# Patient Record
Sex: FEMALE | Race: WHITE | Employment: PART TIME | URBAN - METROPOLITAN AREA
[De-identification: names, ages, dates, MRNs, and addresses within clinical notes are randomized per-mention and may not be internally consistent; named-entity substitution may affect disease eponyms.]

---

## 2017-06-21 ENCOUNTER — OFFICE VISIT (OUTPATIENT)
Dept: FAMILY MEDICINE CLINIC | Age: 62
End: 2017-06-21

## 2017-06-21 VITALS
BODY MASS INDEX: 23.74 KG/M2 | HEIGHT: 62 IN | HEART RATE: 90 BPM | OXYGEN SATURATION: 98 % | TEMPERATURE: 98.5 F | DIASTOLIC BLOOD PRESSURE: 82 MMHG | WEIGHT: 129 LBS | SYSTOLIC BLOOD PRESSURE: 140 MMHG

## 2017-06-21 DIAGNOSIS — J30.2 SEASONAL ALLERGIC RHINITIS, UNSPECIFIED ALLERGIC RHINITIS TRIGGER: Primary | ICD-10-CM

## 2017-06-21 PROCEDURE — 99212 OFFICE O/P EST SF 10 MIN: CPT | Performed by: NURSE PRACTITIONER

## 2017-06-21 RX ORDER — DEXAMETHASONE 4 MG/1
4 TABLET ORAL 2 TIMES DAILY WITH MEALS
Qty: 10 TABLET | Refills: 0 | Status: SHIPPED | OUTPATIENT
Start: 2017-06-21 | End: 2017-06-26

## 2017-06-21 RX ORDER — FLUTICASONE PROPIONATE 50 MCG
1 SPRAY, SUSPENSION (ML) NASAL DAILY
Qty: 1 BOTTLE | Refills: 0 | Status: SHIPPED | OUTPATIENT
Start: 2017-06-21 | End: 2017-12-12 | Stop reason: ALTCHOICE

## 2017-06-21 ASSESSMENT — ENCOUNTER SYMPTOMS
HEMOPTYSIS: 0
SORE THROAT: 0
WHEEZING: 0
FACIAL SWELLING: 0
RHINORRHEA: 1
VOICE CHANGE: 0
COUGH: 1
HEARTBURN: 0
SHORTNESS OF BREATH: 0
STRIDOR: 0
TROUBLE SWALLOWING: 0
SINUS PRESSURE: 0

## 2017-07-10 ENCOUNTER — OFFICE VISIT (OUTPATIENT)
Dept: FAMILY MEDICINE CLINIC | Age: 62
End: 2017-07-10

## 2017-07-10 VITALS
SYSTOLIC BLOOD PRESSURE: 124 MMHG | WEIGHT: 127 LBS | BODY MASS INDEX: 23.37 KG/M2 | HEART RATE: 95 BPM | DIASTOLIC BLOOD PRESSURE: 86 MMHG | OXYGEN SATURATION: 99 % | HEIGHT: 62 IN

## 2017-07-10 DIAGNOSIS — M25.551 HIP PAIN, ACUTE, RIGHT: Primary | ICD-10-CM

## 2017-07-10 DIAGNOSIS — R05.9 COUGH: ICD-10-CM

## 2017-07-10 DIAGNOSIS — J30.9 ALLERGIC RHINITIS, UNSPECIFIED ALLERGIC RHINITIS TRIGGER, UNSPECIFIED RHINITIS SEASONALITY: ICD-10-CM

## 2017-07-10 PROCEDURE — 99212 OFFICE O/P EST SF 10 MIN: CPT | Performed by: NURSE PRACTITIONER

## 2017-07-10 RX ORDER — BENZONATATE 100 MG/1
100 CAPSULE ORAL 3 TIMES DAILY PRN
Qty: 30 CAPSULE | Refills: 0 | Status: SHIPPED | OUTPATIENT
Start: 2017-07-10 | End: 2017-07-20

## 2017-07-10 RX ORDER — METHYLPREDNISOLONE 4 MG/1
TABLET ORAL
Qty: 1 KIT | Refills: 0 | Status: SHIPPED | OUTPATIENT
Start: 2017-07-10 | End: 2017-08-02 | Stop reason: ALTCHOICE

## 2017-07-10 ASSESSMENT — ENCOUNTER SYMPTOMS
COUGH: 1
RHINORRHEA: 1
SHORTNESS OF BREATH: 0
WHEEZING: 0

## 2017-07-10 ASSESSMENT — PATIENT HEALTH QUESTIONNAIRE - PHQ9
SUM OF ALL RESPONSES TO PHQ9 QUESTIONS 1 & 2: 0
2. FEELING DOWN, DEPRESSED OR HOPELESS: 0
1. LITTLE INTEREST OR PLEASURE IN DOING THINGS: 0
SUM OF ALL RESPONSES TO PHQ QUESTIONS 1-9: 0

## 2017-08-02 ENCOUNTER — OFFICE VISIT (OUTPATIENT)
Dept: FAMILY MEDICINE CLINIC | Age: 62
End: 2017-08-02

## 2017-08-02 VITALS
BODY MASS INDEX: 22.82 KG/M2 | WEIGHT: 124 LBS | HEIGHT: 62 IN | DIASTOLIC BLOOD PRESSURE: 90 MMHG | SYSTOLIC BLOOD PRESSURE: 146 MMHG | OXYGEN SATURATION: 97 % | HEART RATE: 133 BPM

## 2017-08-02 DIAGNOSIS — R26.2 DIFFICULTY WALKING: ICD-10-CM

## 2017-08-02 DIAGNOSIS — S70.02XS CONTUSION OF LEFT HIP, SEQUELA: Primary | ICD-10-CM

## 2017-08-02 DIAGNOSIS — F41.1 GENERALIZED ANXIETY DISORDER: ICD-10-CM

## 2017-08-02 DIAGNOSIS — M25.551 HIP PAIN, ACUTE, RIGHT: ICD-10-CM
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
## 2017-08-02 PROCEDURE — 99214 OFFICE O/P EST MOD 30 MIN: CPT | Performed by: FAMILY MEDICINE

## 2017-08-02 ASSESSMENT — ENCOUNTER SYMPTOMS
ABDOMINAL PAIN: 0
BACK PAIN: 1
EYE DISCHARGE: 0
EYE REDNESS: 0
NAUSEA: 0
COLOR CHANGE: 0
SHORTNESS OF BREATH: 0

## 2017-08-29 ENCOUNTER — OFFICE VISIT (OUTPATIENT)
Dept: FAMILY MEDICINE CLINIC | Age: 62
End: 2017-08-29

## 2017-08-29 VITALS
HEIGHT: 62 IN | BODY MASS INDEX: 23.92 KG/M2 | WEIGHT: 130 LBS | HEART RATE: 106 BPM | SYSTOLIC BLOOD PRESSURE: 132 MMHG | OXYGEN SATURATION: 98 % | DIASTOLIC BLOOD PRESSURE: 80 MMHG

## 2017-08-29 DIAGNOSIS — H11.31 SUBCONJUNCTIVAL HEMATOMA, RIGHT: ICD-10-CM

## 2017-08-29 DIAGNOSIS — B30.9 ACUTE VIRAL CONJUNCTIVITIS OF BOTH EYES: Primary | ICD-10-CM

## 2017-08-29 PROCEDURE — 99214 OFFICE O/P EST MOD 30 MIN: CPT | Performed by: FAMILY MEDICINE

## 2017-08-29 RX ORDER — TOBRAMYCIN AND DEXAMETHASONE 3; 1 MG/ML; MG/ML
1 SUSPENSION/ DROPS OPHTHALMIC 3 TIMES DAILY PRN
Qty: 1 BOTTLE | Refills: 0 | Status: SHIPPED | OUTPATIENT
Start: 2017-08-29 | End: 2017-09-05

## 2017-08-29 RX ORDER — AZITHROMYCIN 250 MG/1
250 TABLET, FILM COATED ORAL DAILY
Qty: 6 TABLET | Refills: 0 | Status: SHIPPED | OUTPATIENT
Start: 2017-08-29 | End: 2017-09-03

## 2017-08-29 ASSESSMENT — ENCOUNTER SYMPTOMS
ABDOMINAL PAIN: 0
EYE ITCHING: 1
EYE PAIN: 0
EYE REDNESS: 1
NAUSEA: 0
EYE DISCHARGE: 1
COLOR CHANGE: 0
SHORTNESS OF BREATH: 0
PHOTOPHOBIA: 0

## 2017-11-01 ENCOUNTER — TELEPHONE (OUTPATIENT)
Dept: ORTHOPEDIC SURGERY | Age: 62
End: 2017-11-01

## 2017-11-01 ENCOUNTER — OFFICE VISIT (OUTPATIENT)
Dept: ORTHOPEDIC SURGERY | Age: 62
End: 2017-11-01

## 2017-11-01 VITALS — WEIGHT: 130.07 LBS | BODY MASS INDEX: 26.22 KG/M2 | HEIGHT: 59 IN

## 2017-11-01 DIAGNOSIS — S63.91XA HAND SPRAIN, RIGHT, INITIAL ENCOUNTER: Primary | ICD-10-CM

## 2017-11-01 DIAGNOSIS — M25.641 FINGER STIFFNESS, RIGHT: ICD-10-CM

## 2017-11-01 PROCEDURE — L3918 METACARP FX ORTHOSIS PRE OTS: HCPCS | Performed by: ORTHOPAEDIC SURGERY

## 2017-11-01 PROCEDURE — 99213 OFFICE O/P EST LOW 20 MIN: CPT | Performed by: ORTHOPAEDIC SURGERY

## 2017-11-01 NOTE — PROGRESS NOTES
Chief Complaint  Wrist Pain (Right wrist pain, fell 10/27/2017)      History of Present Illness:  Nemo Lake is a 60 y.o. female right-hand-dominant who presents today for right wrist pain and right thumb pain which began 5 days ago when she fell landing awkwardly on her right hand and wrist.  She had immediate pain followed by swelling and stiffness. She was seen in the emergency room, x-rays were inconclusive for fracture but she was immobilized. Her pain at this point is at the base of the thumb. This pain is improved with elevation and rest.  Pain is worsened with attempted gripping or lifting. She denies numbness but feels quite a bit of weakness. Medical History  Past Medical History:   Diagnosis Date    Anxiety     Hyperlipidemia     Hypertension      Past Surgical History:   Procedure Laterality Date    BUNIONECTOMY       Family History   Problem Relation Age of Onset    High Blood Pressure Mother     Diabetes Mother      Social History     Social History    Marital status:      Spouse name: N/A    Number of children: N/A    Years of education: N/A     Social History Main Topics    Smoking status: Former Smoker    Smokeless tobacco: None    Alcohol use 3.6 oz/week     6 Cans of beer per week      Comment: 6/day    Drug use: No    Sexual activity: Not Asked     Other Topics Concern    None     Social History Narrative    None     Current Outpatient Prescriptions   Medication Sig Dispense Refill    ibuprofen (ADVIL;MOTRIN) 600 MG tablet Take 1 tablet by mouth every 8 hours as needed for Pain 20 tablet 0    sertraline (ZOLOFT) 50 MG tablet Take 1 tablet by mouth daily 30 tablet 11    fluticasone (FLONASE) 50 MCG/ACT nasal spray 1 spray by Nasal route daily 1 Bottle 0     No current facility-administered medications for this visit.       No Known Allergies    Review of Systems  Relevant review of systems reviewed and available in the patient's chart    Vital Signs  There were Health

## 2017-11-22 ENCOUNTER — OFFICE VISIT (OUTPATIENT)
Dept: ORTHOPEDIC SURGERY | Age: 62
End: 2017-11-22

## 2017-11-22 VITALS
WEIGHT: 130.07 LBS | BODY MASS INDEX: 26.22 KG/M2 | HEIGHT: 59 IN | HEART RATE: 88 BPM | DIASTOLIC BLOOD PRESSURE: 85 MMHG | SYSTOLIC BLOOD PRESSURE: 137 MMHG

## 2017-11-22 DIAGNOSIS — S66.811A EXTENSOR TENDON RUPTURE OF HAND, RIGHT, INITIAL ENCOUNTER: ICD-10-CM

## 2017-11-22 DIAGNOSIS — M25.531 WRIST PAIN, RIGHT: Primary | ICD-10-CM

## 2017-11-22 DIAGNOSIS — S63.91XA HAND SPRAIN, RIGHT, INITIAL ENCOUNTER: ICD-10-CM

## 2017-11-22 PROCEDURE — 99213 OFFICE O/P EST LOW 20 MIN: CPT | Performed by: ORTHOPAEDIC SURGERY

## 2017-11-22 NOTE — LETTER
[place] [id number] [address]  Phone: [phone]  Fax: [phone]    Ashutosh Donahue MD        November 22, 2017     Patient: Sherolyn Sandifer   YOB: 1955   Date of Visit: 11/22/2017       To Whom it May Concern:    Li Rico was seen in my clinic on 11/22/2017. She may return to work on 11/27/17. she was under our care from 10/27/17 and unable to work secondary to her injury. .    If you have any questions or concerns, please don't hesitate to call.     Sincerely,       MD Ashutosh Anne MD

## 2017-11-22 NOTE — PROGRESS NOTES
Chief Complaint   Patient presents with    Follow-up     Right hand sprain DOI 10/27/2017       HISTORY OF PRESENT ILLNESS:  Fernando Swartz is a 60 y.o.  patient here for repeat evaluation after sustaining a Right hand and wrist sprain one month ago, treated nonoperatively. Overall her symptoms are much improved. She has mild achiness at the base of the thumb. She denies pain at the wrist.  She denies new trauma. However she states over the past 1 or 2 weeks she has been unable to extend her right thumb. She denies numbness    ROS:  ROS neg     Past medical history is reviewed again today, no changes to report    PHYSICAL EXAMINATION:  Patient is alert and pleasant, in no acute distress. The affected extremity is examined today. Right hand and wrist reveal good alignment clinically except for flex deformity thumb IP joint. She does not have active IP joint extension, passively correctable. Consistent with EPL rupture. Minimal discomfort over the dorsal radial hand. There is tenderness to palpation volarly and dorsally at the [facility] joint of the thumb with a positive grind test.  Negative Finkelstein test.  No pain with palpation at the distal radius or distal ulna. There is slight ecchymosis dorsal radial hand. No ecchymosis volarly today. Compartments soft throughout. Full active flexion and extension of the other digits without clicking or snapping.  strength weakness is present. No numbness of the fingers. X-rays:Patient refused any x-rays today secondary to financial constraints    X-rays from the emergency room are reviewed again today:  3 views of the right wrist, impression:     Impression   1. Negative for fracture. 2. Chronic erosive changes changes of the proximal medial carpus with   associated chondrocalcinosis.  Gout is a diagnostic consideration. On closer look today, there may have been a dorsal distal radius fracture noted on the lateral with buckling.   No displacement of

## 2017-12-12 PROBLEM — E83.42 HYPOMAGNESEMIA: Status: ACTIVE | Noted: 2017-12-12

## 2017-12-12 PROBLEM — F10.939 ALCOHOL WITHDRAWAL (HCC): Status: ACTIVE | Noted: 2017-12-12

## 2017-12-12 PROBLEM — A41.9 SEPSIS (HCC): Status: ACTIVE | Noted: 2017-12-12

## 2017-12-12 PROBLEM — E87.1 HYPONATREMIA: Status: ACTIVE | Noted: 2017-12-12

## 2017-12-12 PROBLEM — E87.6 HYPOKALEMIA: Status: ACTIVE | Noted: 2017-12-12

## 2017-12-12 PROBLEM — J18.9 PNA (PNEUMONIA): Status: ACTIVE | Noted: 2017-12-12

## 2017-12-13 PROBLEM — F10.931 ALCOHOL WITHDRAWAL SYNDROME, WITH DELIRIUM (HCC): Status: ACTIVE | Noted: 2017-12-12

## 2017-12-17 PROBLEM — F10.931 ALCOHOL WITHDRAWAL SYNDROME, WITH DELIRIUM (HCC): Status: RESOLVED | Noted: 2017-12-12 | Resolved: 2017-12-17

## 2017-12-17 PROBLEM — A41.9 SEPSIS (HCC): Status: RESOLVED | Noted: 2017-12-12 | Resolved: 2017-12-17

## 2017-12-27 ENCOUNTER — TELEPHONE (OUTPATIENT)
Dept: FAMILY MEDICINE CLINIC | Age: 62
End: 2017-12-27

## 2017-12-27 NOTE — TELEPHONE ENCOUNTER
Was hospitalized for pneumonia, put on 6 different rx's and could only afford 5 of them, inhaler (Spriva handihaler) she could not afford and was $400 and wants to know if there is a cheaper option. Having side effects with the new meds she was put on: Swelling of feet, itchy all over, extreme diarrhea. She's asking for you to call her regarding this before her appt. Future Appointments  Date Time Provider Travon Alanis   1/2/2018 8:00 AM DO TRETN Lomeli       Please advise.

## 2017-12-27 NOTE — TELEPHONE ENCOUNTER
Patient does not have insurance. The cheapest she had for the Spiriva was at [address] [place] [address] for $388 and would like something different.  Patient does not feel it is necessary to go back to ER at this time but will go if the swelling and itching get worse

## 2017-12-28 ENCOUNTER — OFFICE VISIT (OUTPATIENT)
Dept: OTHER | Age: 62
End: 2017-12-28

## 2017-12-28 ENCOUNTER — HOSPITAL ENCOUNTER (OUTPATIENT)
Dept: OTHER | Age: 62
Discharge: HOME | End: 2017-12-28
Attending: INTERNAL MEDICINE | Admitting: INTERNAL MEDICINE

## 2017-12-28 ENCOUNTER — HOSPITAL ENCOUNTER (OUTPATIENT)
Dept: GENERAL RADIOLOGY | Age: 62
Discharge: HOME | End: 2017-12-28
Attending: INTERNAL MEDICINE | Admitting: INTERNAL MEDICINE

## 2017-12-28 VITALS — HEART RATE: 80 BPM | TEMPERATURE: 98.3 F | SYSTOLIC BLOOD PRESSURE: 152 MMHG | DIASTOLIC BLOOD PRESSURE: 94 MMHG

## 2017-12-28 DIAGNOSIS — I34.0 SEVERE MITRAL REGURGITATION: ICD-10-CM

## 2017-12-28 DIAGNOSIS — E87.1 HYPONATREMIA: Primary | ICD-10-CM

## 2017-12-28 DIAGNOSIS — I10 ESSENTIAL HYPERTENSION: ICD-10-CM

## 2017-12-28 DIAGNOSIS — J18.9 PNEUMONIA OF RIGHT MIDDLE LOBE DUE TO INFECTIOUS ORGANISM: ICD-10-CM

## 2017-12-28 LAB
ANION GAP SERPL CALCULATED.3IONS-SCNC: 15 MMOL/L (ref 3–16)
BUN BLDV-MCNC: 6 MG/DL (ref 7–20)
CALCIUM SERPL-MCNC: 9.7 MG/DL (ref 8.3–10.6)
CHLORIDE BLD-SCNC: 100 MMOL/L (ref 99–110)
CO2: 23 MMOL/L (ref 21–32)
CREAT SERPL-MCNC: <0.5 MG/DL (ref 0.6–1.2)
GFR AFRICAN AMERICAN: >60
GFR NON-AFRICAN AMERICAN: >60
GLUCOSE BLD-MCNC: 100 MG/DL (ref 70–99)
POTASSIUM SERPL-SCNC: 4.6 MMOL/L (ref 3.5–5.1)
SODIUM BLD-SCNC: 138 MMOL/L (ref 136–145)

## 2017-12-28 PROCEDURE — 99999 PR OFFICE/OUTPT VISIT,PROCEDURE ONLY: CPT | Performed by: INTERNAL MEDICINE

## 2017-12-28 RX ORDER — LISINOPRIL 20 MG/1
20 TABLET ORAL DAILY
Qty: 30 TABLET | Refills: 5 | Status: ON HOLD | OUTPATIENT
Start: 2017-12-28 | End: 2018-02-09 | Stop reason: HOSPADM

## 2017-12-28 NOTE — PROGRESS NOTES
SUBJECTIVE:   New patient to [facility], she was hospitalized 12/12 - 12/17 with alcohol withdrawal requiring ICU detox via [facility], sepsis due to Pneumonia of right lung, and severe mitral regurgitation with flail posterior leaflet mitral valve with subsequent pulmonary hypertension. She is unable to afford Spiriva but did to fill five other prescriptions. She has been taking lactulose twice per day with subsequent diarrhea. She has not had a drink of alcohol since discharge. She has not smoked in 40 years. She is not volume overloaded from the mitral regurgitation.      She is seen in [facility] due to her loss of insurance. MEDICATIONS:  lisinopril (PRINIVIL;ZESTRIL) 10 MG tablet Take 1 tablet by mouth daily   metoprolol succinate (TOPROL XL) 50 MG extended release tablet Take 1 tablet by mouth daily   lactulose (CHRONULAC) 10 GM/15ML solution Take 15 mLs by mouth 2 times daily   potassium chloride (KLOR-CON M) 10 MEQ extended release tablet Take 1 tablet by mouth 2 times daily  (discontinue)   tiotropium (SPIRIVA HANDIHALER) 18 MCG inhalation capsule Inhale 1 capsule into the lungs daily (did not fill, discontinue)       LABS: 12/16/2017  WBC 5.1   HGB 11.7 (L)   PLT 79 (L)      (L)   K 3.4 (L)   CL 96 (L)   CO2 23   BUN 7   CREATININE <0.5 (L)   GLUCOSE 88     LABS: 12-28-17  Sodium 138    Potassium 4.6    Chloride 100    CO2 23    Anion Gap 15    Glucose 100     BUN 6    CREATININE <0.5        PFT: pending next visit    OBJECTIVE:    VITALS:  /94   Pulse 80   Temp 98.3 °F  O2sat 97% on room air  HEENT:  Oropharynx clear, no lymphadenopathy,   LUNGS:  Clear and without wheezes, reasonable air movement  HEART:  Regular rhythm, harsh IV/VI systolic murmur left sternal border  ABD:  Benign, active bowel sounds  EXT:  No edema, pulses palpable  NEURO:  No focal neurologic deficits noted. No asterixis. ASSESSMENT / PLAN:   1.  Alcohol induced cirrhosis with evidence of hepatic encephalopathy:  Continue off any alcohol at all. Continue taking lactulose 15 mls twice per day. This is intended to cause some diarrhea. 2. Hyponatremia (low sodium) in the hospital:  This has resolved by lab check today. Continue off alcohol. 3. Thrombocytopenia (low platelets) due to cirrhosis. No need to recheck today but will check in 6-8 weeks. 4. Emphysema (COPD):  No need for an inhaler at this time. We will check spirometry (pulmonary function test) next visit. 5. Blood pressure:  Elevated today at 152/94, goal less than 140/90. Continue taking metoprolol succinate (Toprol XL) 50 mg once per day. Continue taking Lisinopril but increase from 10 mg to 20 mg daily. Take two of the 10 mg tablets until you run out then fill a new prescription. 6. Mitral regurgitation with \"flail\" posterior leaflet mitral valve: We will assure a follow up with cardiothoracic surgery. 7. Pneumonia: We will recheck a chest x-ray to confirm resolution in six weeks. 8. Hypokalemia:  Potassium has normalized. She can now STOP taking potassium, especially on the ACE inhibitor.        Follow-up in two weeks, confirm follow up with cardiothoracic surgery

## 2017-12-28 NOTE — PATIENT INSTRUCTIONS
1. Alcohol induced cirrhosis with evidence of hepatic encephalopathy:  Continue off any alcohol at all. Continue taking lactulose 15 mls twice per day. This is intended to cause some diarrhea. 2. Hyponatremia (low sodium) in the hospital:  We will recheck a metabolic panel at the lab. 3. Thrombocytopenia (low platelets) due to cirrhosis. No need to recheck today but will check in 6-8 weeks. 4. Emphysema (COPD):  No need for an inhaler at this time. We will check spirometry (pulmonary function test) next visit. 5. Blood pressure:  Elevated today at 152/94, goal less than 140/90. Continue taking metoprolol succinate (Toprol XL) 50 mg once per day. Continue taking Lisinopril but increase from 10 mg to 20 mg daily. Take two of the 10 mg tablets until you run out then fill a new prescription. 6. Mitral regurgitation with \"flail\" posterior leaflet mitral valve: We will assure a follow up with cardiothoracic surgery. 7. Pneumonia: We will recheck a chest x-ray to confirm resolution in six weeks.      Follow-up in two weeks

## 2018-01-03 ENCOUNTER — OFFICE VISIT (OUTPATIENT)
Dept: CARDIOTHORACIC SURGERY | Age: 63
End: 2018-01-03

## 2018-01-03 VITALS
DIASTOLIC BLOOD PRESSURE: 110 MMHG | WEIGHT: 130 LBS | OXYGEN SATURATION: 94 % | HEART RATE: 108 BPM | HEIGHT: 62 IN | TEMPERATURE: 97.3 F | BODY MASS INDEX: 23.92 KG/M2 | SYSTOLIC BLOOD PRESSURE: 188 MMHG

## 2018-01-03 DIAGNOSIS — I34.0 SEVERE MITRAL REGURGITATION: Primary | ICD-10-CM

## 2018-01-03 DIAGNOSIS — I10 ESSENTIAL HYPERTENSION: ICD-10-CM

## 2018-01-03 PROCEDURE — 99202 OFFICE O/P NEW SF 15 MIN: CPT | Performed by: THORACIC SURGERY (CARDIOTHORACIC VASCULAR SURGERY)

## 2018-01-03 RX ORDER — POTASSIUM CHLORIDE 750 MG/1
10 TABLET, EXTENDED RELEASE ORAL 2 TIMES DAILY
Status: ON HOLD | COMMUNITY
End: 2018-02-05 | Stop reason: CLARIF

## 2018-01-03 NOTE — Clinical Note
Thank you for your referral patient will need coronary catheterizations ultrasound liver and CT chest patient's could qualify for minimum access mitral valve if coronary is normal

## 2018-01-03 NOTE — PROGRESS NOTES
Review of Systems:  Constitutional:  + fatigue, + difficulty sleeping. No night sweats, headaches, weight loss. Eyes:  + glasses. No glaucoma, cataracts. ENMT:  No nosebleeds, deviated septum. Has not followed with dentist in a while. Cardiac:  + occasional heart palpiations after exertion. No chest pain. No arrhythmias, previous MI. Vascular:  No claudication, varicosities. GI:  No PUD, heartburn. :  No kidney stones, frequent UTIs. Musculoskeletal:  + arthritis. No gout. Respiratory:  + occasional SOB. No emphysema, asthma. Integumentary:  No dermatitis, itching, rash. Neurological: + possible hx of TIA 2-3 years ago. No stroke, seizures. Psychiatric: + anxiety. No depression. Endocrine: No diabetes, thyroid issues. Hematologic:  No bleeding, easy bruising. Immunologic:  No known cancer, steroid therapies.
since 12/12/17.  Drug use: No    Sexual activity: Not on file     Other Topics Concern    Not on file     Social History Narrative    No narrative on file       Family History:  Heart Disease:   Stroke:   Cancer:   Diabetes:   Hypertension:   Aneurysm/PVD:         Problem Relation Age of Onset    High Blood Pressure Mother     Diabetes Mother        Review of Systems:  Constitutional:  No night sweats, headaches, weight loss. Eyes:  No glaucoma, cataracts. ENMT:  No nosebleeds, deviated septum. Cardiac:  No arrhythmias, previous MI. Vascular:  No claudication, varicosities. GI:  No PUD, heartburn. :  No kidney stones, frequent UTIs  Musculoskeletal:  No arthritis, gout. Respiratory:  No SOB, emphysema, asthma. Integumentary:  No dermatitis, itching, rash. Neurological:  No stroke, TIAs, seizures. Psychiatric:  No depression, anxiety. Endocrine: No diabetes, thyroid issues. Hematologic:  No bleeding, easy bruising. Immunologic:  No known cancer, steroid therapies. Physical Examination:    BP (!) 188/110 (Site: Left Arm, Position: Sitting, Cuff Size: Medium Adult)   Pulse 108   Temp 97.3 °F (36.3 °C) (Oral)   Ht 5' 2\" (1.575 m)   Wt 130 lb (59 kg)   SpO2 94%   BMI 23.78 kg/m²      BP RUE:  BP LUE:   Admission Weight: 130 lb (59 kg)   Hand dominance:    General appearance: NAD, well nourished  Eyes: anicteric, PERRLA  ENMT: no scars or lesions, no nasal deformity, normal dentition, no cyanosis of oral mucosa  Neck: no masses, no thyroid enlargement, no JVD. Respiratory: effort is unlabored, symmetric, no crackles, wheezes or rubs. No palpable/percussable abnormalities. Cardiovascular: regular, no murmur. PMI normal, no thrill. No carotid bruits. No edema or varicosities. Abdominal aorta cannot be appreciated given body habitus. GI: abdomen soft, nondistended, no organomegaly. No masses.   Lymphatic: no cervical/supraclavicular adenopathy  Musculoskeletal: strength and tone normal.

## 2018-01-04 ENCOUNTER — TELEPHONE (OUTPATIENT)
Dept: CARDIOTHORACIC SURGERY | Age: 63
End: 2018-01-04

## 2018-01-04 ENCOUNTER — TELEPHONE (OUTPATIENT)
Dept: CARDIOLOGY CLINIC | Age: 63
End: 2018-01-04

## 2018-01-05 NOTE — TELEPHONE ENCOUNTER
Patient informed to take her morning medications as prescribed with sips of water. She also received her email from [address] [facility].

## 2018-01-10 ENCOUNTER — HOSPITAL ENCOUNTER (OUTPATIENT)
Dept: VASCULAR LAB | Age: 63
Discharge: HOME | End: 2018-01-10
Attending: THORACIC SURGERY (CARDIOTHORACIC VASCULAR SURGERY) | Admitting: THORACIC SURGERY (CARDIOTHORACIC VASCULAR SURGERY)

## 2018-01-10 VITALS — OXYGEN SATURATION: 96 %

## 2018-01-10 DIAGNOSIS — I34.0 MITRAL VALVE INSUFFICIENCY, UNSPECIFIED ETIOLOGY: ICD-10-CM

## 2018-01-10 DIAGNOSIS — G45.9 TRANSIENT CEREBRAL ISCHEMIC ATTACK: ICD-10-CM

## 2018-01-10 RX ORDER — ALBUTEROL SULFATE 90 UG/1
4 AEROSOL, METERED RESPIRATORY (INHALATION) ONCE
Status: COMPLETED | OUTPATIENT
Start: 2018-01-10 | End: 2018-01-10

## 2018-01-10 RX ADMIN — ALBUTEROL SULFATE 4 PUFF: 90 AEROSOL, METERED RESPIRATORY (INHALATION) at 10:10

## 2018-01-17 ENCOUNTER — OFFICE VISIT (OUTPATIENT)
Dept: CARDIOTHORACIC SURGERY | Age: 63
End: 2018-01-17

## 2018-01-17 VITALS
HEART RATE: 113 BPM | DIASTOLIC BLOOD PRESSURE: 118 MMHG | SYSTOLIC BLOOD PRESSURE: 170 MMHG | HEIGHT: 62 IN | TEMPERATURE: 97.6 F | WEIGHT: 133.5 LBS | OXYGEN SATURATION: 97 % | BODY MASS INDEX: 24.56 KG/M2

## 2018-01-17 DIAGNOSIS — E83.42 HYPOMAGNESEMIA: ICD-10-CM

## 2018-01-17 DIAGNOSIS — I34.0 SEVERE MITRAL REGURGITATION: Primary | ICD-10-CM

## 2018-01-17 DIAGNOSIS — I10 ESSENTIAL HYPERTENSION: ICD-10-CM

## 2018-01-17 PROCEDURE — 99213 OFFICE O/P EST LOW 20 MIN: CPT | Performed by: THORACIC SURGERY (CARDIOTHORACIC VASCULAR SURGERY)

## 2018-01-18 ENCOUNTER — TELEPHONE (OUTPATIENT)
Dept: CARDIOTHORACIC SURGERY | Age: 63
End: 2018-01-18

## 2018-01-18 NOTE — TELEPHONE ENCOUNTER
Spoke with patient regarding schedule of surgery on 2/5/2018 @ 7:00 am with arrival time of 5:30 am @ [facility] with Valorie Hicks to include: minimal access mitral valve repair, possible tissue valve replacement. Patient completed most of her pre-op labs but needs urinalysis and updated chest x-ray. I explained that a nurse might be calling you to complete prior to surgery or they may elect to complete upon arrival for surgery. Patient knows not to eat or drink after midnight the day of surgery and to call me with any questions or concerns.

## 2018-01-24 ENCOUNTER — HOSPITAL ENCOUNTER (OUTPATIENT)
Dept: OTHER | Age: 63
Discharge: HOME | End: 2018-01-24
Attending: INTERNAL MEDICINE | Admitting: INTERNAL MEDICINE

## 2018-01-24 ENCOUNTER — OFFICE VISIT (OUTPATIENT)
Dept: OTHER | Age: 63
End: 2018-01-24

## 2018-01-24 VITALS
DIASTOLIC BLOOD PRESSURE: 100 MMHG | OXYGEN SATURATION: 98 % | BODY MASS INDEX: 23.74 KG/M2 | SYSTOLIC BLOOD PRESSURE: 180 MMHG | WEIGHT: 129 LBS | HEIGHT: 62 IN | HEART RATE: 104 BPM

## 2018-01-24 DIAGNOSIS — K70.30 ALCOHOLIC CIRRHOSIS OF LIVER WITHOUT ASCITES (HCC): Primary | ICD-10-CM

## 2018-01-24 DIAGNOSIS — I34.0 SEVERE MITRAL REGURGITATION: ICD-10-CM

## 2018-01-24 DIAGNOSIS — J43.9 PULMONARY EMPHYSEMA, UNSPECIFIED EMPHYSEMA TYPE (HCC): ICD-10-CM

## 2018-01-24 PROCEDURE — 99999 PR OFFICE/OUTPT VISIT,PROCEDURE ONLY: CPT | Performed by: INTERNAL MEDICINE

## 2018-01-24 RX ORDER — HYDROCHLOROTHIAZIDE 25 MG/1
25 TABLET ORAL DAILY
Qty: 30 TABLET | Refills: 3 | Status: ON HOLD | OUTPATIENT
Start: 2018-01-24 | End: 2018-02-09 | Stop reason: HOSPADM

## 2018-01-24 NOTE — PROGRESS NOTES
SUBJECTIVE:  One month follow up from 12/28/17  She will have mitral valve repair at [facility] on February 5. Pre-op testing including spirometry and carotid doppler have been completed. She stopped lactulose on the recommendation of Dr. Lauren Zamora. Elevated blood pressure at home with average 150/90. Dyspnea on exertion about the same. She is unable to walk up a flight of stairs without stopping. Continued fatigue and dyspnea. Using a cane for stability in the her right (right hand dominant) mostly for \"security\". Minimal lower extremity edema mostly when upright for a while. History of present illness:  Hospitalized 12/12 - 12/17 with alcohol withdrawal requiring ICU detox via [address] [place] [address] Ave, sepsis due to Pneumonia of right lung, and severe mitral regurgitation with flail posterior leaflet mitral valve with subsequent pulmonary hypertension. She is unable to afford Spiriva but did to fill five other prescriptions. She has been taking lactulose twice per day with subsequent diarrhea. She has not had a drink of alcohol since discharge. She has not smoked in 40 years. She is not volume overloaded from the mitral regurgitation.   She is seen in [facility] due to her loss of insurance. MEDICATIONS:  potassium chloride (KLOR-CON M) 20 MEQ extended release tablet Taking one tablet daily    lisinopril (PRINIVIL;ZESTRIL) 20 MG tablet Take 1 tablet by mouth daily   metoprolol succinate (TOPROL XL) 50 MG extended release tablet Take 1 tablet by mouth daily       LABS:  01/12/2018  WBC 5.9   HGB 12.5           K 3.9   CL 98 (L)   CO2 22   BUN 7   CREATININE 0.6   GLUCOSE 108 (H)     Cholesterol, Total 202    Triglycerides 104    HDL 67    LDL Calculated 114      INR (12/16/17) 1.19    Spirometry (1/11/18) FVC 1.84 liters, 65% predicted, FEV1 1.35 liters, 59% predicted, with FEV1/FVC ratio of 70%. There was no response to bronchodilator.   Lung volume showed borderline criteria for air

## 2018-02-05 PROBLEM — Z98.890 S/P MVR (MITRAL VALVE REPAIR): Status: ACTIVE | Noted: 2018-02-05

## 2018-02-12 ENCOUNTER — TELEPHONE (OUTPATIENT)
Dept: CARDIOTHORACIC SURGERY | Age: 63
End: 2018-02-12

## 2018-02-12 NOTE — TELEPHONE ENCOUNTER
Bandar Lacey with [facility] called with update on patient. She went out to see her today. Patients BP sitting was 160/80 and then dropped to 110/70 while standing. + for orthostatic hypotension. Patient had some very mild dizziness but no other symptoms. She was discharged on a lot of new medications. Also has some minimal trace edema in ankles/feet bilaterally. She also reports some serosanguinous drainage out of CT site. Denies purulence or odor. No redness around site. Advised to keep clean with soap and water and keep covered if needed. We will add her on to our schedule for this week to discuss blood pressure issues and check her incisions. She is agreeable to this. No further needs at this time.

## 2018-02-14 ENCOUNTER — OFFICE VISIT (OUTPATIENT)
Dept: CARDIOTHORACIC SURGERY | Age: 63
End: 2018-02-14

## 2018-02-14 VITALS
DIASTOLIC BLOOD PRESSURE: 100 MMHG | TEMPERATURE: 97.9 F | HEIGHT: 62 IN | WEIGHT: 123.6 LBS | BODY MASS INDEX: 22.74 KG/M2 | HEART RATE: 89 BPM | OXYGEN SATURATION: 96 % | SYSTOLIC BLOOD PRESSURE: 172 MMHG

## 2018-02-14 DIAGNOSIS — Z98.890 S/P MVR (MITRAL VALVE REPAIR): Primary | ICD-10-CM

## 2018-02-14 DIAGNOSIS — E87.1 HYPONATREMIA WITH EXTRACELLULAR FLUID DEPLETION: ICD-10-CM

## 2018-02-14 DIAGNOSIS — I10 HYPERTENSION, UNSPECIFIED TYPE: Primary | ICD-10-CM

## 2018-02-14 DIAGNOSIS — I34.0 SEVERE MITRAL REGURGITATION: ICD-10-CM

## 2018-02-14 PROCEDURE — 99024 POSTOP FOLLOW-UP VISIT: CPT | Performed by: THORACIC SURGERY (CARDIOTHORACIC VASCULAR SURGERY)

## 2018-02-14 RX ORDER — LISINOPRIL 20 MG/1
20 TABLET ORAL DAILY
Qty: 30 TABLET | Refills: 3 | Status: SHIPPED | OUTPATIENT
Start: 2018-02-14 | End: 2018-02-28 | Stop reason: SDUPTHER

## 2018-02-14 RX ORDER — LISINOPRIL 2.5 MG/1
20 TABLET ORAL DAILY
Qty: 30 TABLET | Refills: 3 | Status: SHIPPED | OUTPATIENT
Start: 2018-02-14 | End: 2018-02-14 | Stop reason: DRUGHIGH

## 2018-02-16 ENCOUNTER — TELEPHONE (OUTPATIENT)
Dept: CARDIOTHORACIC SURGERY | Age: 63
End: 2018-02-16

## 2018-02-16 DIAGNOSIS — R60.0 LOCALIZED EDEMA: Primary | ICD-10-CM

## 2018-02-16 RX ORDER — POTASSIUM CHLORIDE 750 MG/1
10 TABLET, EXTENDED RELEASE ORAL DAILY
Qty: 30 TABLET | Refills: 0 | OUTPATIENT
Start: 2018-02-16 | End: 2018-04-09 | Stop reason: ALTCHOICE

## 2018-02-16 RX ORDER — FUROSEMIDE 20 MG/1
20 TABLET ORAL 2 TIMES DAILY
Qty: 60 TABLET | Refills: 0 | OUTPATIENT
Start: 2018-02-16 | End: 2018-02-28 | Stop reason: SDUPTHER

## 2018-02-19 ENCOUNTER — HOSPITAL ENCOUNTER (OUTPATIENT)
Dept: OTHER | Age: 63
Discharge: HOME | End: 2018-02-19
Attending: INTERNAL MEDICINE | Admitting: INTERNAL MEDICINE

## 2018-02-19 ENCOUNTER — HOSPITAL ENCOUNTER (OUTPATIENT)
Dept: GENERAL RADIOLOGY | Age: 63
Discharge: HOME | End: 2018-02-19
Attending: INTERNAL MEDICINE | Admitting: INTERNAL MEDICINE

## 2018-02-19 ENCOUNTER — OFFICE VISIT (OUTPATIENT)
Dept: OTHER | Age: 63
End: 2018-02-19

## 2018-02-19 VITALS
HEART RATE: 86 BPM | OXYGEN SATURATION: 98 % | WEIGHT: 129 LBS | DIASTOLIC BLOOD PRESSURE: 78 MMHG | HEIGHT: 62 IN | BODY MASS INDEX: 23.74 KG/M2 | SYSTOLIC BLOOD PRESSURE: 146 MMHG

## 2018-02-19 DIAGNOSIS — I34.0 SEVERE MITRAL REGURGITATION: ICD-10-CM

## 2018-02-19 DIAGNOSIS — I10 ESSENTIAL HYPERTENSION: ICD-10-CM

## 2018-02-19 DIAGNOSIS — E87.1 HYPONATREMIA: Primary | ICD-10-CM

## 2018-02-19 DIAGNOSIS — E87.6 HYPOKALEMIA: ICD-10-CM

## 2018-02-19 LAB
ANION GAP SERPL CALCULATED.3IONS-SCNC: 12 MMOL/L (ref 3–16)
BUN BLDV-MCNC: 8 MG/DL (ref 7–20)
CALCIUM SERPL-MCNC: 9.6 MG/DL (ref 8.3–10.6)
CHLORIDE BLD-SCNC: 95 MMOL/L (ref 99–110)
CO2: 25 MMOL/L (ref 21–32)
CREAT SERPL-MCNC: 0.6 MG/DL (ref 0.6–1.2)
GFR AFRICAN AMERICAN: >60
GFR NON-AFRICAN AMERICAN: >60
GLUCOSE BLD-MCNC: 106 MG/DL (ref 70–99)
INR BLD: 1.2 (ref 0.85–1.15)
MAGNESIUM: 1.3 MG/DL (ref 1.8–2.4)
POTASSIUM SERPL-SCNC: 4.2 MMOL/L (ref 3.5–5.1)
PROTHROMBIN TIME: 13.6 SEC (ref 9.6–13)
SODIUM BLD-SCNC: 132 MMOL/L (ref 136–145)

## 2018-02-19 PROCEDURE — 99999 PR OFFICE/OUTPT VISIT,PROCEDURE ONLY: CPT | Performed by: INTERNAL MEDICINE

## 2018-02-19 NOTE — PROGRESS NOTES
SUBJECTIVE:   Three week follow up, mitral valve repair on February 5. Seen by CT surgery on 2/14. She will complete a 15 day course of amiodarone in 4 days. Started back on potassium yesterday by Dr. Melissa Rowan for low potassium on furosemide. She will be seen by cardiology on 2/28. One month follow up from 12/28/17  She will have mitral valve repair at [facility] on February 5. Pre-op testing including spirometry and carotid doppler have been completed. She stopped lactulose on the recommendation of Dr. Melissa Rowan. Elevated blood pressure at home with average 150/90. Dyspnea on exertion about the same. She is unable to walk up a flight of stairs without stopping. Continued fatigue and dyspnea. Using a cane for stability in the her right (right hand dominant) mostly for \"security\". Minimal lower extremity edema mostly when upright for a while.     History of present illness:  Hospitalized 12/12 - 12/17 with alcohol withdrawal requiring ICU detox via [address] [place] [address], sepsis due to Pneumonia of right lung, and severe mitral regurgitation with flail posterior leaflet mitral valve with subsequent pulmonary hypertension.   She is unable to afford Spiriva but did to fill five other prescriptions. Ehsan Clayton has been taking lactulose twice per day with subsequent diarrhea.   She has not had a drink of alcohol since discharge.  She has not smoked in 40 years. Ehsan Clayton is not volume overloaded from the mitral regurgitation.   She is seen in [facility] due to her loss of insurance.         MEDICATIONS:  furosemide (LASIX) 20 MG tablet Take 1 tablet by mouth 2 times daily   potassium chloride (KLOR-CON M) 10 MEQ extended release tablet Take 1 tablet by mouth daily   lisinopril (PRINIVIL;ZESTRIL) 20 MG tablet Take 1 tablet by mouth daily   aspirin 81 MG EC tablet Take 1 tablet by mouth daily   atorvastatin (LIPITOR) 20 MG tablet Take 1 tablet by mouth nightly   clopidogrel (PLAVIX) 75 MG tablet Take 1 tablet by mouth daily

## 2018-02-28 ENCOUNTER — OFFICE VISIT (OUTPATIENT)
Dept: CARDIOTHORACIC SURGERY | Age: 63
End: 2018-02-28

## 2018-02-28 VITALS
WEIGHT: 124 LBS | HEART RATE: 107 BPM | BODY MASS INDEX: 22.82 KG/M2 | SYSTOLIC BLOOD PRESSURE: 164 MMHG | DIASTOLIC BLOOD PRESSURE: 100 MMHG | HEIGHT: 62 IN | OXYGEN SATURATION: 99 %

## 2018-02-28 DIAGNOSIS — I34.0 SEVERE MITRAL REGURGITATION: ICD-10-CM

## 2018-02-28 DIAGNOSIS — Z98.890 S/P MVR (MITRAL VALVE REPAIR): Primary | ICD-10-CM

## 2018-02-28 DIAGNOSIS — I10 HYPERTENSION, UNSPECIFIED TYPE: ICD-10-CM

## 2018-02-28 DIAGNOSIS — R60.0 LOCALIZED EDEMA: ICD-10-CM

## 2018-02-28 PROCEDURE — 99024 POSTOP FOLLOW-UP VISIT: CPT | Performed by: THORACIC SURGERY (CARDIOTHORACIC VASCULAR SURGERY)

## 2018-02-28 RX ORDER — FUROSEMIDE 20 MG/1
20 TABLET ORAL 2 TIMES DAILY
Qty: 60 TABLET | Refills: 0 | Status: SHIPPED | OUTPATIENT
Start: 2018-02-28 | End: 2018-02-28 | Stop reason: SDUPTHER

## 2018-02-28 RX ORDER — FUROSEMIDE 20 MG/1
20 TABLET ORAL DAILY
Qty: 60 TABLET | Refills: 0 | Status: SHIPPED | OUTPATIENT
Start: 2018-02-28 | End: 2018-04-09 | Stop reason: ALTCHOICE

## 2018-02-28 RX ORDER — LISINOPRIL 20 MG/1
40 TABLET ORAL DAILY
Qty: 30 TABLET | Refills: 3 | Status: SHIPPED | OUTPATIENT
Start: 2018-02-28 | End: 2018-05-26 | Stop reason: SDUPTHER

## 2018-03-16 ENCOUNTER — OFFICE VISIT (OUTPATIENT)
Dept: CARDIOLOGY CLINIC | Age: 63
End: 2018-03-16

## 2018-03-16 VITALS
HEIGHT: 62 IN | SYSTOLIC BLOOD PRESSURE: 180 MMHG | WEIGHT: 130 LBS | OXYGEN SATURATION: 96 % | BODY MASS INDEX: 23.92 KG/M2 | HEART RATE: 114 BPM | DIASTOLIC BLOOD PRESSURE: 100 MMHG

## 2018-03-16 DIAGNOSIS — Z98.890 S/P MVR (MITRAL VALVE REPAIR): ICD-10-CM

## 2018-03-16 DIAGNOSIS — I34.0 SEVERE MITRAL REGURGITATION: Primary | ICD-10-CM

## 2018-03-16 DIAGNOSIS — E87.5 HYPERKALEMIA: ICD-10-CM

## 2018-03-16 DIAGNOSIS — I10 ESSENTIAL HYPERTENSION: ICD-10-CM

## 2018-03-16 DIAGNOSIS — E78.2 MIXED HYPERLIPIDEMIA: ICD-10-CM

## 2018-03-16 PROCEDURE — 99214 OFFICE O/P EST MOD 30 MIN: CPT | Performed by: NURSE PRACTITIONER

## 2018-03-16 RX ORDER — METOPROLOL SUCCINATE 100 MG/1
100 TABLET, EXTENDED RELEASE ORAL DAILY
Qty: 30 TABLET | Refills: 3 | Status: SHIPPED | OUTPATIENT
Start: 2018-03-16 | End: 2018-03-23 | Stop reason: SDUPTHER

## 2018-03-16 NOTE — PATIENT INSTRUCTIONS
1. Increase the metoprolol (Toprol XL) from 50 mg to 100 mg once daily  2. Continue the lisinopril 40 mg once daily  3. Continue the furosemide (Lasix) 20 mg once daily  4. Follow up with Dr. Gary Celeste next week about the right side pain  5.  Follow up with me in 3-4 weeks

## 2018-03-16 NOTE — PROGRESS NOTES
HCT 27.4 02/08/2018    HCT 26.0 02/07/2018    MCV 88.8 02/09/2018    MCV 88.6 02/08/2018    MCV 88.9 02/07/2018    RDW 13.7 02/09/2018    RDW 14.0 02/08/2018    RDW 13.9 02/07/2018     02/09/2018     02/08/2018     02/07/2018     BMP:  Lab Results   Component Value Date     02/19/2018     02/09/2018     02/08/2018    K 4.2 02/19/2018    K 4.1 02/09/2018    K 4.7 02/08/2018    K 5.2 02/08/2018    CL 95 02/19/2018    CL 93 02/09/2018    CL 90 02/08/2018    CO2 25 02/19/2018    CO2 25 02/09/2018    CO2 24 02/08/2018    PHOS 4.2 12/17/2017    PHOS 3.6 12/16/2017    PHOS 3.3 12/15/2017    BUN 8 02/19/2018    BUN 17 02/09/2018    BUN 21 02/08/2018    CREATININE 0.6 02/19/2018    CREATININE <0.5 02/09/2018    CREATININE 0.6 02/08/2018     BNP:   Lab Results   Component Value Date    PROBNP 4,299 12/14/2017     Troponin: No components found for: TROPONIN  Lipids:   Lab Results   Component Value Date    TRIG 104 01/12/2018    TRIG 70 04/19/2016    HDL 67 01/12/2018     04/19/2016    LDLCALC 114 01/12/2018    [address] 107 04/19/2016     Cardiac Imaging:  Echo 12/14/17:   Left ventricular systolic function is normal with ejection fraction estimated at 55%. No regional wall motion abnormalities. Elevated left ventricular diastolic filling pressure. Severely dilated left atrium. Right atrium is mildly dilated. There is a flail posterior mitral leaflet. Eccentric anteriorly directed severe mitral regurgitation consistent with a flail posterior leaflet. Moderate tricuspid regurgitation. Systolic pulmonary artery pressure (SPAP) estimated at 82 mmHg consistent with severe pulmonary hypertension (RA pressure 15 mmHg). IVC size is dilated (>2.1 cm) and collapses < 50% with respiration consistent with markedly elevated RA pressure (15 mmHg). RITA 12/15/17:    Normal left ventricular systolic function, with estimated ejection fraction of 55%.    Mild thickening of the

## 2018-03-23 ENCOUNTER — OFFICE VISIT (OUTPATIENT)
Dept: FAMILY MEDICINE CLINIC | Age: 63
End: 2018-03-23

## 2018-03-23 VITALS
HEART RATE: 97 BPM | OXYGEN SATURATION: 99 % | BODY MASS INDEX: 23.92 KG/M2 | DIASTOLIC BLOOD PRESSURE: 108 MMHG | SYSTOLIC BLOOD PRESSURE: 200 MMHG | HEIGHT: 62 IN | WEIGHT: 130 LBS

## 2018-03-23 DIAGNOSIS — G45.8 OTHER SPECIFIED TRANSIENT CEREBRAL ISCHEMIAS: Chronic | ICD-10-CM

## 2018-03-23 DIAGNOSIS — Z98.890 S/P MVR (MITRAL VALVE REPAIR): ICD-10-CM

## 2018-03-23 DIAGNOSIS — I10 UNCONTROLLED HYPERTENSION: Primary | ICD-10-CM

## 2018-03-23 DIAGNOSIS — M94.0 COSTOCHONDRITIS: ICD-10-CM

## 2018-03-23 PROCEDURE — 99214 OFFICE O/P EST MOD 30 MIN: CPT | Performed by: FAMILY MEDICINE

## 2018-03-23 RX ORDER — AMLODIPINE BESYLATE 5 MG/1
5 TABLET ORAL DAILY
Qty: 30 TABLET | Refills: 3 | Status: SHIPPED | OUTPATIENT
Start: 2018-03-23 | End: 2018-04-09 | Stop reason: SDUPTHER

## 2018-03-23 RX ORDER — METOPROLOL SUCCINATE 100 MG/1
100 TABLET, EXTENDED RELEASE ORAL DAILY
Qty: 30 TABLET | Refills: 3 | Status: SHIPPED | OUTPATIENT
Start: 2018-03-23 | End: 2019-03-26 | Stop reason: ALTCHOICE

## 2018-03-23 ASSESSMENT — ENCOUNTER SYMPTOMS
EYE REDNESS: 0
COLOR CHANGE: 0
SHORTNESS OF BREATH: 0
NAUSEA: 0
EYE DISCHARGE: 0
ABDOMINAL PAIN: 0

## 2018-03-23 NOTE — PROGRESS NOTES
flank pain. Musculoskeletal: Negative for gait problem. Skin: Negative for color change and pallor. Neurological: Negative for facial asymmetry and speech difficulty. Hematological: Does not bruise/bleed easily. Psychiatric/Behavioral: Negative for confusion. Objective:   Physical Exam   Constitutional: She is oriented to person, place, and time. She appears well-developed and well-nourished. No distress. HENT:   Head: Normocephalic and atraumatic. Eyes: Conjunctivae are normal. Right eye exhibits no discharge. Left eye exhibits no discharge. No scleral icterus. Neck: Normal range of motion. Neck supple. Cardiovascular: Normal rate, regular rhythm and normal heart sounds. Pulmonary/Chest: Effort normal and breath sounds normal. No stridor. No respiratory distress. She has no wheezes. She has no rales. Chest wall is not dull to percussion. She exhibits tenderness and bony tenderness. She exhibits no mass, no crepitus, no deformity, no swelling and no retraction. Musculoskeletal: She exhibits no edema. Neurological: She is alert and oriented to person, place, and time. Skin: Skin is warm and dry. No rash noted. She is not diaphoretic. No erythema. No pallor. Psychiatric: She has a normal mood and affect. Her behavior is normal. Judgment and thought content normal.   Nursing note and vitals reviewed. Assessment:      1. Uncontrolled hypertension     2. S/P MVR (mitral valve repair)     3. Costochondritis     4. Other specified transient cerebral ischemias s/p             Plan:      Nannette was seen today for follow up after procedure and hypertension. Diagnoses and all orders for this visit:    Uncontrolled hypertension    S/P MVR (mitral valve repair)    Costochondritis    Other specified transient cerebral ischemias s/p      -     metoprolol succinate (TOPROL XL) 100 MG extended release tablet; Take 1 tablet by mouth daily  -     amLODIPine (NORVASC) 5 MG tablet;  Take 1 tablet by mouth daily      Patient Instructions   Adding 5mg of norvasc for your blood pressure, and follow up with your cardiologist in 3-4 weeks  Ok for tylenol for pain  Stay with 5 lb weight lifting

## 2018-03-23 NOTE — PATIENT INSTRUCTIONS
Adding 5mg of norvasc for your blood pressure, and follow up with your cardiologist in 3-4 weeks  Ok for tylenol for pain  Stay with 5 lb weight lifting

## 2018-04-09 ENCOUNTER — OFFICE VISIT (OUTPATIENT)
Dept: CARDIOLOGY CLINIC | Age: 63
End: 2018-04-09

## 2018-04-09 VITALS
WEIGHT: 133.5 LBS | SYSTOLIC BLOOD PRESSURE: 142 MMHG | HEART RATE: 85 BPM | OXYGEN SATURATION: 99 % | BODY MASS INDEX: 24.56 KG/M2 | DIASTOLIC BLOOD PRESSURE: 76 MMHG | HEIGHT: 62 IN

## 2018-04-09 DIAGNOSIS — E78.2 MIXED HYPERLIPIDEMIA: ICD-10-CM

## 2018-04-09 DIAGNOSIS — Z98.890 S/P MVR (MITRAL VALVE REPAIR): ICD-10-CM

## 2018-04-09 DIAGNOSIS — I10 ESSENTIAL HYPERTENSION: ICD-10-CM

## 2018-04-09 DIAGNOSIS — I34.0 SEVERE MITRAL REGURGITATION: Primary | ICD-10-CM

## 2018-04-09 PROCEDURE — 99213 OFFICE O/P EST LOW 20 MIN: CPT | Performed by: NURSE PRACTITIONER

## 2018-04-09 RX ORDER — AMLODIPINE BESYLATE 10 MG/1
10 TABLET ORAL EVERY EVENING
Qty: 30 TABLET | Refills: 5 | Status: SHIPPED | OUTPATIENT
Start: 2018-04-09 | End: 2019-03-26 | Stop reason: SDUPTHER

## 2018-04-18 ENCOUNTER — HOSPITAL ENCOUNTER (OUTPATIENT)
Dept: GENERAL RADIOLOGY | Age: 63
Discharge: HOME | End: 2018-04-18

## 2018-04-18 LAB
ANION GAP SERPL CALCULATED.3IONS-SCNC: 18 MMOL/L (ref 3–16)
BUN BLDV-MCNC: 10 MG/DL (ref 7–20)
CALCIUM SERPL-MCNC: 9.7 MG/DL (ref 8.3–10.6)
CHLORIDE BLD-SCNC: 92 MMOL/L (ref 99–110)
CO2: 22 MMOL/L (ref 21–32)
CREAT SERPL-MCNC: 0.6 MG/DL (ref 0.6–1.2)
GFR AFRICAN AMERICAN: >60
GFR NON-AFRICAN AMERICAN: >60
GLUCOSE BLD-MCNC: 110 MG/DL (ref 70–99)
MAGNESIUM: 1.6 MG/DL (ref 1.8–2.4)
POTASSIUM SERPL-SCNC: 4.2 MMOL/L (ref 3.5–5.1)
SODIUM BLD-SCNC: 132 MMOL/L (ref 136–145)

## 2018-04-24 ENCOUNTER — TELEPHONE (OUTPATIENT)
Dept: FAMILY MEDICINE CLINIC | Age: 63
End: 2018-04-24

## 2018-05-26 DIAGNOSIS — I10 HYPERTENSION, UNSPECIFIED TYPE: ICD-10-CM

## 2018-05-31 ENCOUNTER — TELEPHONE (OUTPATIENT)
Dept: CARDIOTHORACIC SURGERY | Age: 63
End: 2018-05-31

## 2018-05-31 DIAGNOSIS — I10 ESSENTIAL HYPERTENSION: Primary | ICD-10-CM

## 2018-05-31 RX ORDER — LISINOPRIL 40 MG/1
40 TABLET ORAL DAILY
COMMUNITY
End: 2018-05-31 | Stop reason: SDUPTHER

## 2018-05-31 RX ORDER — LISINOPRIL 40 MG/1
40 TABLET ORAL DAILY
Qty: 30 TABLET | Refills: 2 | OUTPATIENT
Start: 2018-05-31 | End: 2018-11-19 | Stop reason: SDUPTHER

## 2018-05-31 RX ORDER — LISINOPRIL 20 MG/1
TABLET ORAL
Qty: 30 TABLET | Refills: 2 | Status: SHIPPED | OUTPATIENT
Start: 2018-05-31 | End: 2018-05-31 | Stop reason: DRUGHIGH

## 2018-08-07 ENCOUNTER — OFFICE VISIT (OUTPATIENT)
Dept: CARDIOLOGY CLINIC | Age: 63
End: 2018-08-07

## 2018-08-07 VITALS
DIASTOLIC BLOOD PRESSURE: 90 MMHG | WEIGHT: 143 LBS | HEART RATE: 113 BPM | SYSTOLIC BLOOD PRESSURE: 168 MMHG | OXYGEN SATURATION: 97 % | BODY MASS INDEX: 26.31 KG/M2 | HEIGHT: 62 IN

## 2018-08-07 DIAGNOSIS — I10 ESSENTIAL HYPERTENSION: ICD-10-CM

## 2018-08-07 DIAGNOSIS — E78.2 MIXED HYPERLIPIDEMIA: ICD-10-CM

## 2018-08-07 DIAGNOSIS — I34.0 SEVERE MITRAL REGURGITATION: Primary | ICD-10-CM

## 2018-08-07 DIAGNOSIS — Z98.890 S/P MVR (MITRAL VALVE REPAIR): ICD-10-CM

## 2018-08-07 PROCEDURE — 99213 OFFICE O/P EST LOW 20 MIN: CPT | Performed by: NURSE PRACTITIONER

## 2018-08-07 NOTE — PROGRESS NOTES
[address]   Cardiac Evaluation    Primary Care Doctor:  Dora Mcneill, APRN - CNP    Chief Complaint   Patient presents with    Follow-up     no cardiac complaints        History of Present Illness:   I had the pleasure of seeing Dinora Russell in follow up for MR s/p MVr, HTN, HLD and hx of ETOH with hyponatremia. At last visit we stopped the Plavix, discontinued Lasix and KCL and increased the amlodipine to 10 mg. She is not sure of her medicines today. She has been out of at least 3 of them for several days. She was waiting to get paid. She has been checking BP at home and running 130/ 80's, not > 517 systolic. She reports increased stress recently (son and grand-daughter living with them, drug user, has hand foot mouth disease). She admits to eating more since no longer drinking alcohol. She works as  standing all day. She is having severe pain in hip due to \"sciatica\". She is tearful in office today. Nannette Cabrera describes symptoms including dyspnea, fatigue but denies chest pain, palpitations, orthopnea, PND, early saiety, edema, syncope. NYHA:   III  ACC/ AHA Stage:    C    Past Medical History:   has a past medical history of Anxiety; ETOH abuse; Hyperlipidemia; and Hypertension. Surgical History:   has a past surgical history that includes Bunionectomy and Mitral valve surgery. Social History:   reports that she quit smoking about 39 years ago. Her smoking use included Cigarettes. She quit after 3.00 years of use. She has never used smokeless tobacco. She reports that she drinks about 9.0 oz of alcohol per week . She reports that she does not use drugs. Family History:   Family History   Problem Relation Age of Onset    High Blood Pressure Mother     Diabetes Mother        Home Medications:  Prior to Admission medications    Medication Sig Start Date End Date Taking?  Authorizing Provider   lisinopril (PRINIVIL;ZESTRIL) 40 MG tablet Take 1 tablet by mouth daily 5/31/18  Yes Ang Anand MD   magnesium oxide (MAGOX 400) 400 (241.3 Mg) MG TABS tablet Take 1 tablet by mouth daily 4/9/18  Yes TIFFANIE Munoz CNP   amLODIPine (NORVASC) 10 MG tablet Take 1 tablet by mouth every evening 4/9/18  Yes TIFFANIE Munoz CNP   aspirin 81 MG EC tablet Take 1 tablet by mouth daily 2/10/18  Yes TIFFANIE Adams CNP   atorvastatin (LIPITOR) 20 MG tablet Take 1 tablet by mouth nightly 2/9/18  Yes TIFFANIE Adams CNP   metoprolol succinate (TOPROL XL) 100 MG extended release tablet Take 1 tablet by mouth daily 3/23/18   Maryann Leon DO   Multiple Vitamins-Minerals (THERAPEUTIC MULTIVITAMIN-MINERALS) tablet Take 1 tablet by mouth daily 2/10/18   TIFFANIE Adams CNP        Allergies:  Hctz [hydrochlorothiazide]     Review of Systems:   · Constitutional: there has been no unanticipated weight loss. There's been no change in energy level, sleep pattern, or activity level. · Eyes: No visual changes or diplopia. No scleral icterus. · ENT: No Headaches, hearing loss or vertigo. No mouth sores or sore throat. · Cardiovascular: Reviewed in HPI  · Respiratory: No cough or wheezing, no sputum production. No hematemesis. · Gastrointestinal: No abdominal pain, appetite loss, blood in stools. No change in bowel or bladder habits. · Genitourinary: No dysuria, trouble voiding, or hematuria. · Musculoskeletal:  No gait disturbance, weakness or joint complaints. · Integumentary: No rash or pruritis. · Neurological: No headache, diplopia, change in muscle strength, numbness or tingling. No change in gait, balance, coordination, mood, affect, memory, mentation, behavior. · Psychiatric: No anxiety, no depression. · Endocrine: No malaise, fatigue or temperature intolerance. No excessive thirst, fluid intake, or urination. No tremor.   · Hematologic/Lymphatic: No abnormal bruising or bleeding, blood clots or swollen lymph 5.2 02/08/2018    CL 92 04/18/2018    CL 95 02/19/2018    CL 93 02/09/2018    CO2 22 04/18/2018    CO2 25 02/19/2018    CO2 25 02/09/2018    PHOS 4.2 12/17/2017    PHOS 3.6 12/16/2017    PHOS 3.3 12/15/2017    BUN 10 04/18/2018    BUN 8 02/19/2018    BUN 17 02/09/2018    CREATININE 0.6 04/18/2018    CREATININE 0.6 02/19/2018    CREATININE <0.5 02/09/2018     BNP:   Lab Results   Component Value Date    PROBNP 4,299 12/14/2017        Cardiac Imaging:  Echo 12/14/17:   Left ventricular systolic function is normal with ejection fraction estimated at 55%. No regional wall motion abnormalities. Elevated left ventricular diastolic filling pressure. Severely dilated left atrium. Right atrium is mildly dilated. There is a flail posterior mitral leaflet. Eccentric anteriorly directed severe mitral regurgitation consistent with a flail posterior leaflet. Moderate tricuspid regurgitation. Systolic pulmonary artery pressure (SPAP) estimated at 82 mmHg consistent with severe pulmonary hypertension (RA pressure 15 mmHg). IVC size is dilated (>2.1 cm) and collapses < 50% with respiration consistent with markedly elevated RA pressure (15 mmHg). RITA 12/15/17:    Normal left ventricular systolic function, with estimated ejection fraction of 55%. Mild thickening of the mitral leaflets. Flail posterior mitral leaflet. Eccentric anteriorly directed severe mitral regurgitation consistent with a flail posterior leaflet. Systolic reversal noted in left upper pulmonary vein. Severely dilated left atrium. Mild to moderate tricuspid regurgitation. Systolic pulmonary artery pressure (SPAP) estimated at 51 mmHg consistent with moderate pulmonary hypertension (RA pressure 8 mmHg). Moderate left pleural effusion. CARDIAC CATH 1/15/18: IMPRESSION:  1. Angiographically normal coronary arteries. 2.  Severe eccentric mitral regurgitation from flail posterior leaflet.   3.  Elevated left ventricular filling

## 2018-11-09 ENCOUNTER — TELEPHONE (OUTPATIENT)
Dept: FAMILY MEDICINE CLINIC | Age: 63
End: 2018-11-09

## 2018-11-09 DIAGNOSIS — R26.9 GAIT ABNORMALITY: Primary | ICD-10-CM

## 2018-11-19 DIAGNOSIS — I10 ESSENTIAL HYPERTENSION: Primary | ICD-10-CM

## 2018-11-19 DIAGNOSIS — Z98.890 S/P MVR (MITRAL VALVE REPAIR): ICD-10-CM

## 2018-11-19 RX ORDER — LISINOPRIL 20 MG/1
20 TABLET ORAL DAILY
Qty: 90 TABLET | Refills: 3 | Status: SHIPPED | OUTPATIENT
Start: 2018-11-19 | End: 2019-03-26 | Stop reason: SDUPTHER

## 2019-01-04 RX ORDER — METOPROLOL SUCCINATE 100 MG/1
TABLET, EXTENDED RELEASE ORAL
Qty: 30 TABLET | Refills: 0 | Status: SHIPPED | OUTPATIENT
Start: 2019-01-04 | End: 2019-03-26 | Stop reason: SDUPTHER

## 2019-03-26 ENCOUNTER — OFFICE VISIT (OUTPATIENT)
Dept: CARDIOLOGY CLINIC | Age: 64
End: 2019-03-26
Payer: MEDICARE

## 2019-03-26 VITALS
DIASTOLIC BLOOD PRESSURE: 74 MMHG | HEIGHT: 62 IN | BODY MASS INDEX: 27.64 KG/M2 | HEART RATE: 113 BPM | SYSTOLIC BLOOD PRESSURE: 138 MMHG | OXYGEN SATURATION: 98 % | WEIGHT: 150.2 LBS

## 2019-03-26 DIAGNOSIS — I10 ESSENTIAL HYPERTENSION: ICD-10-CM

## 2019-03-26 DIAGNOSIS — I34.0 SEVERE MITRAL REGURGITATION: Primary | ICD-10-CM

## 2019-03-26 DIAGNOSIS — Z98.890 S/P MVR (MITRAL VALVE REPAIR): ICD-10-CM

## 2019-03-26 DIAGNOSIS — E78.2 MIXED HYPERLIPIDEMIA: ICD-10-CM

## 2019-03-26 PROCEDURE — 99213 OFFICE O/P EST LOW 20 MIN: CPT | Performed by: NURSE PRACTITIONER

## 2019-03-26 PROCEDURE — G8427 DOCREV CUR MEDS BY ELIG CLIN: HCPCS | Performed by: NURSE PRACTITIONER

## 2019-03-26 PROCEDURE — G8484 FLU IMMUNIZE NO ADMIN: HCPCS | Performed by: NURSE PRACTITIONER

## 2019-03-26 PROCEDURE — G8419 CALC BMI OUT NRM PARAM NOF/U: HCPCS | Performed by: NURSE PRACTITIONER

## 2019-03-26 PROCEDURE — 1036F TOBACCO NON-USER: CPT | Performed by: NURSE PRACTITIONER

## 2019-03-26 PROCEDURE — 1090F PRES/ABSN URINE INCON ASSESS: CPT | Performed by: NURSE PRACTITIONER

## 2019-03-26 PROCEDURE — 1123F ACP DISCUSS/DSCN MKR DOCD: CPT | Performed by: NURSE PRACTITIONER

## 2019-03-26 PROCEDURE — 4040F PNEUMOC VAC/ADMIN/RCVD: CPT | Performed by: NURSE PRACTITIONER

## 2019-03-26 PROCEDURE — 3017F COLORECTAL CA SCREEN DOC REV: CPT | Performed by: NURSE PRACTITIONER

## 2019-03-26 PROCEDURE — G8400 PT W/DXA NO RESULTS DOC: HCPCS | Performed by: NURSE PRACTITIONER

## 2019-03-26 RX ORDER — AMLODIPINE BESYLATE 10 MG/1
10 TABLET ORAL EVERY EVENING
Qty: 90 TABLET | Refills: 1 | Status: SHIPPED | OUTPATIENT
Start: 2019-03-26 | End: 2020-04-07

## 2019-03-26 RX ORDER — ATORVASTATIN CALCIUM 20 MG/1
20 TABLET, FILM COATED ORAL NIGHTLY
Qty: 90 TABLET | Refills: 1 | Status: SHIPPED | OUTPATIENT
Start: 2019-03-26 | End: 2020-06-01

## 2019-03-26 RX ORDER — METOPROLOL SUCCINATE 50 MG/1
50 TABLET, EXTENDED RELEASE ORAL DAILY
Qty: 90 TABLET | Refills: 1 | Status: SHIPPED | OUTPATIENT
Start: 2019-03-26 | End: 2019-09-11 | Stop reason: SDUPTHER

## 2019-03-26 RX ORDER — LISINOPRIL 20 MG/1
20 TABLET ORAL DAILY
Qty: 90 TABLET | Refills: 1 | Status: SHIPPED | OUTPATIENT
Start: 2019-03-26 | End: 2020-02-05 | Stop reason: SDUPTHER

## 2019-03-26 ASSESSMENT — ENCOUNTER SYMPTOMS
SINUS PAIN: 1
RHINORRHEA: 1
COUGH: 1

## 2019-04-09 ENCOUNTER — HOSPITAL ENCOUNTER (OUTPATIENT)
Age: 64
Discharge: HOME | End: 2019-04-09
Payer: MEDICARE

## 2019-04-09 DIAGNOSIS — I34.0 SEVERE MITRAL REGURGITATION: ICD-10-CM

## 2019-04-09 DIAGNOSIS — Z98.890 S/P MVR (MITRAL VALVE REPAIR): ICD-10-CM

## 2019-04-09 DIAGNOSIS — E78.2 MIXED HYPERLIPIDEMIA: ICD-10-CM

## 2019-04-09 DIAGNOSIS — I10 ESSENTIAL HYPERTENSION: ICD-10-CM

## 2019-04-09 LAB
A/G RATIO: 1.3 (ref 1.1–2.2)
ALBUMIN SERPL-MCNC: 4.5 G/DL (ref 3.4–5)
ALP BLD-CCNC: 118 U/L (ref 40–129)
ALT SERPL-CCNC: 16 U/L (ref 10–40)
ANION GAP SERPL CALCULATED.3IONS-SCNC: 14 MMOL/L (ref 3–16)
AST SERPL-CCNC: 25 U/L (ref 15–37)
BILIRUB SERPL-MCNC: 0.6 MG/DL (ref 0–1)
BUN BLDV-MCNC: 5 MG/DL (ref 7–20)
CALCIUM SERPL-MCNC: 9.8 MG/DL (ref 8.3–10.6)
CHLORIDE BLD-SCNC: 101 MMOL/L (ref 99–110)
CHOLESTEROL, TOTAL: 276 MG/DL (ref 0–199)
CO2: 23 MMOL/L (ref 21–32)
CREAT SERPL-MCNC: 0.6 MG/DL (ref 0.6–1.2)
GFR AFRICAN AMERICAN: >60
GFR NON-AFRICAN AMERICAN: >60
GLOBULIN: 3.6 G/DL
GLUCOSE BLD-MCNC: 99 MG/DL (ref 70–99)
HDLC SERPL-MCNC: 89 MG/DL (ref 40–60)
LDL CHOLESTEROL CALCULATED: 164 MG/DL
POTASSIUM SERPL-SCNC: 4.2 MMOL/L (ref 3.5–5.1)
SODIUM BLD-SCNC: 138 MMOL/L (ref 136–145)
TOTAL PROTEIN: 8.1 G/DL (ref 6.4–8.2)
TRIGL SERPL-MCNC: 114 MG/DL (ref 0–150)
VLDLC SERPL CALC-MCNC: 23 MG/DL

## 2019-04-09 PROCEDURE — 36415 COLL VENOUS BLD VENIPUNCTURE: CPT

## 2019-04-09 PROCEDURE — 80061 LIPID PANEL: CPT

## 2019-04-09 PROCEDURE — 80053 COMPREHEN METABOLIC PANEL: CPT

## 2019-04-11 ENCOUNTER — TELEPHONE (OUTPATIENT)
Dept: CARDIOLOGY CLINIC | Age: 64
End: 2019-04-11

## 2019-04-11 DIAGNOSIS — Z79.899 MEDICATION MANAGEMENT: Primary | ICD-10-CM

## 2019-04-11 NOTE — TELEPHONE ENCOUNTER
----- Message from Amberly Busch, TIFFANIE - CNP sent at 4/10/2019 11:23 AM EDT -----  Kidney and liver panel stable. Cholesterol not ideal.  LDL went up from 114 to 164. She was off the atorvastatin but was asked to restart at last OV. Continue this and recheck lipids, cmp in 3 months.    Thank you, Cassia Sherwood

## 2019-04-11 NOTE — TELEPHONE ENCOUNTER
Spoke with [employer], relayed message per [employer]. Pt states she recently restarted lipitor. She states she will be more on top of taking it. Pt verbalized understanding and lab orders placed in epic to have done midJuly.

## 2019-09-11 ENCOUNTER — OFFICE VISIT (OUTPATIENT)
Dept: FAMILY MEDICINE CLINIC | Age: 64
End: 2019-09-11
Payer: MEDICARE

## 2019-09-11 VITALS
DIASTOLIC BLOOD PRESSURE: 92 MMHG | SYSTOLIC BLOOD PRESSURE: 160 MMHG | BODY MASS INDEX: 27.31 KG/M2 | HEART RATE: 110 BPM | HEIGHT: 62 IN | WEIGHT: 148.4 LBS | OXYGEN SATURATION: 97 %

## 2019-09-11 DIAGNOSIS — I34.0 MITRAL MURMUR: ICD-10-CM

## 2019-09-11 DIAGNOSIS — I10 HYPERTENSION, UNSPECIFIED TYPE: Primary | ICD-10-CM

## 2019-09-11 DIAGNOSIS — F32.9 CURRENT EPISODE OF MAJOR DEPRESSIVE DISORDER WITHOUT PRIOR EPISODE, UNSPECIFIED DEPRESSION EPISODE SEVERITY: ICD-10-CM

## 2019-09-11 DIAGNOSIS — F41.9 ANXIETY: ICD-10-CM

## 2019-09-11 PROCEDURE — 1036F TOBACCO NON-USER: CPT | Performed by: NURSE PRACTITIONER

## 2019-09-11 PROCEDURE — 3017F COLORECTAL CA SCREEN DOC REV: CPT | Performed by: NURSE PRACTITIONER

## 2019-09-11 PROCEDURE — 4040F PNEUMOC VAC/ADMIN/RCVD: CPT | Performed by: NURSE PRACTITIONER

## 2019-09-11 PROCEDURE — 3288F FALL RISK ASSESSMENT DOCD: CPT | Performed by: NURSE PRACTITIONER

## 2019-09-11 PROCEDURE — 1123F ACP DISCUSS/DSCN MKR DOCD: CPT | Performed by: NURSE PRACTITIONER

## 2019-09-11 PROCEDURE — G8400 PT W/DXA NO RESULTS DOC: HCPCS | Performed by: NURSE PRACTITIONER

## 2019-09-11 PROCEDURE — G8510 SCR DEP NEG, NO PLAN REQD: HCPCS | Performed by: NURSE PRACTITIONER

## 2019-09-11 PROCEDURE — G8419 CALC BMI OUT NRM PARAM NOF/U: HCPCS | Performed by: NURSE PRACTITIONER

## 2019-09-11 PROCEDURE — G8427 DOCREV CUR MEDS BY ELIG CLIN: HCPCS | Performed by: NURSE PRACTITIONER

## 2019-09-11 PROCEDURE — 1090F PRES/ABSN URINE INCON ASSESS: CPT | Performed by: NURSE PRACTITIONER

## 2019-09-11 PROCEDURE — 99214 OFFICE O/P EST MOD 30 MIN: CPT | Performed by: NURSE PRACTITIONER

## 2019-09-11 RX ORDER — DULOXETIN HYDROCHLORIDE 30 MG/1
30 CAPSULE, DELAYED RELEASE ORAL DAILY
Qty: 30 CAPSULE | Refills: 1 | Status: SHIPPED | OUTPATIENT
Start: 2019-09-11 | End: 2019-11-13 | Stop reason: SINTOL

## 2019-09-11 RX ORDER — METOPROLOL SUCCINATE 50 MG/1
50 TABLET, EXTENDED RELEASE ORAL DAILY
Qty: 90 TABLET | Refills: 1 | Status: SHIPPED | OUTPATIENT
Start: 2019-09-11 | End: 2020-05-26

## 2019-09-11 ASSESSMENT — ENCOUNTER SYMPTOMS
VOICE CHANGE: 0
ABDOMINAL PAIN: 0
CHOKING: 0
RECTAL PAIN: 0
APNEA: 0
EYE REDNESS: 0
PHOTOPHOBIA: 0
ABDOMINAL DISTENTION: 0
CHEST TIGHTNESS: 0
COLOR CHANGE: 0
TROUBLE SWALLOWING: 0
STRIDOR: 0
SHORTNESS OF BREATH: 0
COUGH: 0
EYE DISCHARGE: 0
EYE PAIN: 0
WHEEZING: 0
FACIAL SWELLING: 0

## 2019-09-11 ASSESSMENT — PATIENT HEALTH QUESTIONNAIRE - PHQ9
SUM OF ALL RESPONSES TO PHQ QUESTIONS 1-9: 0
1. LITTLE INTEREST OR PLEASURE IN DOING THINGS: 0
SUM OF ALL RESPONSES TO PHQ QUESTIONS 1-9: 0
2. FEELING DOWN, DEPRESSED OR HOPELESS: 0
SUM OF ALL RESPONSES TO PHQ9 QUESTIONS 1 & 2: 0

## 2019-09-11 NOTE — PROGRESS NOTES
VALVULOPLASTY  2018    Dr. Akhil Maldonado - Minimal access using Physio ring size 32 & new chordae x2, placement of temporary pacing wire    TRANSESOPHAGEAL ECHOCARDIOGRAM  2018    during mitral valvuloplasty       Social History     Tobacco Use    Smoking status: Former Smoker     Packs/day: 0.50     Years: 9.00     Pack years: 4.50     Types: Cigarettes     Start date: 1970     Last attempt to quit: 1979     Years since quittin.3    Smokeless tobacco: Never Used   Substance Use Topics    Alcohol use: Yes     Alcohol/week: 15.0 standard drinks     Types: 15 Cans of beer per week     Comment: 15/day- has not had any alcohol since 16.  Drug use: No       Family History   Problem Relation Age of Onset    High Blood Pressure Mother     Diabetes Mother        Review of Systems   Constitutional: Positive for activity change, appetite change and fatigue. Negative for chills, diaphoresis, fever and unexpected weight change. HENT: Negative for congestion, drooling, ear discharge, ear pain, facial swelling, mouth sores, nosebleeds, sneezing, trouble swallowing and voice change. Eyes: Negative for photophobia, pain, discharge, redness and visual disturbance. Respiratory: Negative for apnea, cough, choking, chest tightness, shortness of breath, wheezing and stridor. Cardiovascular: Negative for chest pain, palpitations and leg swelling. Gastrointestinal: Negative for abdominal distention, abdominal pain and rectal pain. Endocrine: Negative for polydipsia, polyphagia and polyuria. Genitourinary: Negative for difficulty urinating, flank pain and hematuria. Musculoskeletal: Positive for myalgias (generalized and right sided hip). Negative for arthralgias and gait problem. Skin: Negative for color change, rash and wound. Allergic/Immunologic: Negative for environmental allergies and immunocompromised state.    Neurological: Negative for tremors, seizures, syncope, facial Size: Medium Adult Medium Adult   Pulse: 110    SpO2: 97%    Weight: 148 lb 6.4 oz (67.3 kg)    Height: 5' 2\" (1.575 m)        Assessment/Plan:  1. Hypertension, unspecified type  -Adhere to heart healthy diet. - metoprolol succinate (TOPROL XL) 50 MG extended release tablet; Take 1 tablet by mouth daily  Dispense: 90 tablet; Refill: 1    2. Current episode of major depressive disorder without prior episode, unspecified depression episode severity  -Encouraged to incorporate outside activities during the nice weather time. - DULoxetine (CYMBALTA) 30 MG extended release capsule; Take 1 capsule by mouth daily  Dispense: 30 capsule; Refill: 1  -OTC vitamin B 12 supplement daily  3. Mitral murmur    - External Referral to Cardiology    4. Anxiety  - DULoxetine (CYMBALTA) 30 MG extended release capsule; Take 1 capsule by mouth daily  Dispense: 30 capsule; Refill: 1      Outpatient Encounter Medications as of 9/11/2019   Medication Sig Dispense Refill    metoprolol succinate (TOPROL XL) 50 MG extended release tablet Take 1 tablet by mouth daily 90 tablet 1    DULoxetine (CYMBALTA) 30 MG extended release capsule Take 1 capsule by mouth daily 30 capsule 1    atorvastatin (LIPITOR) 20 MG tablet Take 1 tablet by mouth nightly 90 tablet 1    lisinopril (PRINIVIL;ZESTRIL) 20 MG tablet Take 1 tablet by mouth daily 90 tablet 1    amLODIPine (NORVASC) 10 MG tablet Take 1 tablet by mouth every evening 90 tablet 1    aspirin 81 MG EC tablet Take 1 tablet by mouth daily 30 tablet 3    [DISCONTINUED] metoprolol succinate (TOPROL XL) 50 MG extended release tablet Take 1 tablet by mouth daily (Patient not taking: Reported on 9/11/2019) 90 tablet 1     No facility-administered encounter medications on file as of 9/11/2019.           Verena Singh, CNP

## 2019-09-11 NOTE — PATIENT INSTRUCTIONS
for you. Tell your doctor if you or anyone in your family has had bipolar disorder or used antidepressants before. · Take your medicine as prescribed. It works best and has fewer side effects when you take it exactly as your doctor prescribed. If you miss a dose, and if it's not too late in the day, it's okay to take it. Don't double up doses. · Keep taking your medicine for a while. Taking it for at least 6 months after you feel better can help keep you from getting symptoms again. · Be prepared to try different medicines and doses. You may start to feel better within 1 to 3 weeks after you start the medicine. If you have not improved at all in 3 weeks, your doctor may increase your dose. Or you may need to try a different medicine. Over time, your doctor may need to adjust your dose again to manage your symptoms better. · Don't take any new medicines unless you talk to your doctor first.   Even common medicines like aspirin and some vitamins and herbs can cause problems if you use them while you take antidepressants. · Do not drink alcohol. It can make the side effects worse. · Don't stop taking your medicine on your own. Quitting antidepressants too quickly can cause withdrawal symptoms. It can also cause depression to return. If you are having a problem with your medicine or are ready to quit taking it, work with your doctor. He or she can help you to slowly reduce the dose over a span of a few weeks. · Call your doctor right away for serious side effects. Examples include:  ? Warning signs of suicide. These include talking or writing about death, giving away belongings, or withdrawing from family and friends. ? Manic behavior. This includes having very high energy, sleeping less than normal, being impulsive, or being grouchy or restless. How might you feel about taking antidepressants?   You may feel nervous, relieved, or a little surprised that your doctor is talking to you about

## 2019-10-09 ENCOUNTER — TELEPHONE (OUTPATIENT)
Dept: CARDIOTHORACIC SURGERY | Age: 64
End: 2019-10-09

## 2019-10-09 DIAGNOSIS — Z98.890 S/P MVR (MITRAL VALVE REPAIR): Primary | ICD-10-CM

## 2019-10-11 ENCOUNTER — TELEPHONE (OUTPATIENT)
Dept: CARDIOTHORACIC SURGERY | Age: 64
End: 2019-10-11

## 2019-10-17 ENCOUNTER — HOSPITAL ENCOUNTER (OUTPATIENT)
Dept: CARDIOLOGY | Age: 64
Discharge: HOME | End: 2019-10-17
Payer: MEDICARE

## 2019-10-17 DIAGNOSIS — Z98.890 S/P MVR (MITRAL VALVE REPAIR): ICD-10-CM

## 2019-10-17 LAB
LV EF: 55 %
LVEF MODALITY: NORMAL

## 2019-10-17 PROCEDURE — 93306 TTE W/DOPPLER COMPLETE: CPT

## 2019-10-21 ENCOUNTER — TELEPHONE (OUTPATIENT)
Dept: CARDIOTHORACIC SURGERY | Age: 64
End: 2019-10-21

## 2019-10-30 ENCOUNTER — TELEPHONE (OUTPATIENT)
Dept: CARDIOTHORACIC SURGERY | Age: 64
End: 2019-10-30

## 2019-11-13 ENCOUNTER — OFFICE VISIT (OUTPATIENT)
Dept: FAMILY MEDICINE CLINIC | Age: 64
End: 2019-11-13
Payer: MEDICARE

## 2019-11-13 VITALS
HEART RATE: 68 BPM | HEIGHT: 62 IN | DIASTOLIC BLOOD PRESSURE: 85 MMHG | BODY MASS INDEX: 27.6 KG/M2 | OXYGEN SATURATION: 98 % | SYSTOLIC BLOOD PRESSURE: 140 MMHG | WEIGHT: 150 LBS

## 2019-11-13 DIAGNOSIS — Z12.31 ENCOUNTER FOR SCREENING MAMMOGRAM FOR MALIGNANT NEOPLASM OF BREAST: ICD-10-CM

## 2019-11-13 DIAGNOSIS — M79.7 FIBROMYALGIA: Primary | ICD-10-CM

## 2019-11-13 PROCEDURE — G8417 CALC BMI ABV UP PARAM F/U: HCPCS | Performed by: FAMILY MEDICINE

## 2019-11-13 PROCEDURE — 1123F ACP DISCUSS/DSCN MKR DOCD: CPT | Performed by: FAMILY MEDICINE

## 2019-11-13 PROCEDURE — G8484 FLU IMMUNIZE NO ADMIN: HCPCS | Performed by: FAMILY MEDICINE

## 2019-11-13 PROCEDURE — 1036F TOBACCO NON-USER: CPT | Performed by: FAMILY MEDICINE

## 2019-11-13 PROCEDURE — 3017F COLORECTAL CA SCREEN DOC REV: CPT | Performed by: FAMILY MEDICINE

## 2019-11-13 PROCEDURE — G8400 PT W/DXA NO RESULTS DOC: HCPCS | Performed by: FAMILY MEDICINE

## 2019-11-13 PROCEDURE — 1090F PRES/ABSN URINE INCON ASSESS: CPT | Performed by: FAMILY MEDICINE

## 2019-11-13 PROCEDURE — G8427 DOCREV CUR MEDS BY ELIG CLIN: HCPCS | Performed by: FAMILY MEDICINE

## 2019-11-13 PROCEDURE — 4040F PNEUMOC VAC/ADMIN/RCVD: CPT | Performed by: FAMILY MEDICINE

## 2019-11-13 PROCEDURE — 99214 OFFICE O/P EST MOD 30 MIN: CPT | Performed by: FAMILY MEDICINE

## 2019-11-13 RX ORDER — PREGABALIN 150 MG/1
150 CAPSULE ORAL 2 TIMES DAILY
Qty: 60 CAPSULE | Refills: 0 | Status: SHIPPED | OUTPATIENT
Start: 2019-11-13 | End: 2020-07-17

## 2019-11-13 ASSESSMENT — ENCOUNTER SYMPTOMS
EYE REDNESS: 0
VOMITING: 0
SHORTNESS OF BREATH: 0
BACK PAIN: 1
DIARRHEA: 0
CONSTIPATION: 0
NAUSEA: 0

## 2019-11-18 LAB
6-ACETYLMORPHINE: NOT DETECTED
7-AMINOCLONAZEPAM: NOT DETECTED
ALPHA-OH-ALPRAZOLAM: NOT DETECTED
ALPRAZOLAM: NOT DETECTED
AMPHETAMINE: NOT DETECTED
BARBITURATES: NOT DETECTED
BENZOYLECGONINE: NOT DETECTED
BUPRENORPHINE: NOT DETECTED
CARISOPRODOL: NOT DETECTED
CLONAZEPAM: NOT DETECTED
CODEINE: NOT DETECTED
CREATININE URINE: 32.8 MG/DL (ref 20–400)
DIAZEPAM: NOT DETECTED
DRUGS EXPECTED: NORMAL
EER PAIN MGT DRUG PANEL, HIGH RES/EMIT U: NORMAL
ETHYL GLUCURONIDE: NOT DETECTED
FENTANYL: NOT DETECTED
HYDROCODONE: NOT DETECTED
HYDROMORPHONE: NOT DETECTED
LORAZEPAM: NOT DETECTED
MARIJUANA METABOLITE: NOT DETECTED
MDA: NOT DETECTED
MDEA: NOT DETECTED
MDMA URINE: NOT DETECTED
MEPERIDINE: NOT DETECTED
METHADONE: NOT DETECTED
METHAMPHETAMINE: NOT DETECTED
METHYLPHENIDATE: NOT DETECTED
MIDAZOLAM: NOT DETECTED
MORPHINE: NOT DETECTED
NORBUPRENORPHINE, FREE: NOT DETECTED
NORDIAZEPAM: NOT DETECTED
NORFENTANYL: NOT DETECTED
NORHYDROCODONE, URINE: NOT DETECTED
NOROXYCODONE: NOT DETECTED
NOROXYMORPHONE, URINE: NOT DETECTED
OXAZEPAM: NOT DETECTED
OXYCODONE: NOT DETECTED
OXYMORPHONE: NOT DETECTED
PAIN MANAGEMENT DRUG PANEL: NORMAL
PAIN MANAGEMENT DRUG PANEL: NORMAL
PCP: NOT DETECTED
PHENTERMINE: NOT DETECTED
PROPOXYPHENE: NOT DETECTED
TAPENTADOL, URINE: NOT DETECTED
TAPENTADOL-O-SULFATE, URINE: NOT DETECTED
TEMAZEPAM: NOT DETECTED
TRAMADOL: NOT DETECTED
ZOLPIDEM: NOT DETECTED

## 2020-01-03 ENCOUNTER — OFFICE VISIT (OUTPATIENT)
Dept: RHEUMATOLOGY | Age: 65
End: 2020-01-03
Payer: MEDICARE

## 2020-01-03 VITALS
DIASTOLIC BLOOD PRESSURE: 76 MMHG | SYSTOLIC BLOOD PRESSURE: 126 MMHG | WEIGHT: 156 LBS | BODY MASS INDEX: 28.71 KG/M2 | HEIGHT: 62 IN

## 2020-01-03 PROCEDURE — G8400 PT W/DXA NO RESULTS DOC: HCPCS | Performed by: INTERNAL MEDICINE

## 2020-01-03 PROCEDURE — 3017F COLORECTAL CA SCREEN DOC REV: CPT | Performed by: INTERNAL MEDICINE

## 2020-01-03 PROCEDURE — 4040F PNEUMOC VAC/ADMIN/RCVD: CPT | Performed by: INTERNAL MEDICINE

## 2020-01-03 PROCEDURE — G8484 FLU IMMUNIZE NO ADMIN: HCPCS | Performed by: INTERNAL MEDICINE

## 2020-01-03 PROCEDURE — 1090F PRES/ABSN URINE INCON ASSESS: CPT | Performed by: INTERNAL MEDICINE

## 2020-01-03 PROCEDURE — G8427 DOCREV CUR MEDS BY ELIG CLIN: HCPCS | Performed by: INTERNAL MEDICINE

## 2020-01-03 PROCEDURE — G8417 CALC BMI ABV UP PARAM F/U: HCPCS | Performed by: INTERNAL MEDICINE

## 2020-01-03 PROCEDURE — 99204 OFFICE O/P NEW MOD 45 MIN: CPT | Performed by: INTERNAL MEDICINE

## 2020-01-03 PROCEDURE — 1123F ACP DISCUSS/DSCN MKR DOCD: CPT | Performed by: INTERNAL MEDICINE

## 2020-01-03 PROCEDURE — 1036F TOBACCO NON-USER: CPT | Performed by: INTERNAL MEDICINE

## 2020-01-03 NOTE — PATIENT INSTRUCTIONS
OSTEOARTHRITIS:                Osteoarthritis is a joint disease that most often affects middle-age to elderly people. It is commonly referred to as OA or as \"wear and tear\" of the joints, but we now know that OA is a disease of the entire joint, involving the cartilage, joint lining, ligaments, and bone. Although it is more common in older people, it is not really accurate to say that the joints are just \"wearing out. \"  About 27 million Americans are living with OA, the most common form of joint disease. The lifetime risk of developing OA of the knee is about 46%, and the lifetime risk of developing OA of the hip is 25%, according to the [facility], a long-term study from the [place] and sponsored by [employer] for [employer] and [employer] (often called the [facility]) and the [employer]. OA is a top cause of disability in older people. The goal of treatment in OA is to reduce pain and improve function. There is no cure for the disease, but some treatments attempt to slow disease progression. Fast facts  OA is the most common form of joint disease, and is a leading cause of disability in elderly people. This arthritis tends to occur in the hand joints, spine, hips, knees, and great toes. It is characterized by breakdown of the cartilage (the tissue that cushions the ends of the bones between joints), bony changes of the joints, deterioration of tendons and ligaments, and various degrees of inflammation of the synovium (joint lining). Though some of the joint changes are irreversible, most patients will not need joint replacement surgery. OA symptoms (what you feel) can vary greatly among patients. A rheumatologist can detect arthritis and prescribe the proper treatment. In osteoarthritis, the cartilage between the bones in the joint breaks down (left image). Slowly, affected bones get bigger, as in the hand at right.   What is osteoarthritis? OA is a frequently slowly progressive joint disease typically seen in middle-aged to elderly people. The disease occurs when the joint cartilage breaks down often because of mechanical stress or biochemical alterations, causing the bone underneath to fail. OA can occur together with other types of arthritis, such as gout or rheumatoid arthritis. OA tends to affect commonly used joints such as the hands and spine, and the weight-bearing joints such as the hips and knees. Symptoms include:   Joint pain and stiffness   Knobby swelling at the joint   Cracking or grinding noise with joint movement   Decreased function of the joint  Who gets osteoarthritis? OA affects people of all races and both sexes. Most often, it occurs in  patients age 36 and above. However, it can occur sooner if you have  other risk factors (things that raise the risk of getting OA). Risk factors include:   Older age   Having family members with OA   Obesity   Joint injury or repetitive use (overuse) of joints   Joint deformity such as unequal leg length, bowlegs or knocked knees  How is osteoarthritis diagnosed? Most often doctors detect OA based on the typical symptoms (described earlier) and on results of the physical exam. In some cases, X-rays or other imaging tests may be useful to tell the extent of disease or to help rule out other joint problems. Circles indicate joints that osteoarthritis most often affects. How is osteoarthritis treated? There is no proven treatment yet that can reverse joint damage from OA. The goal of treatment is to reduce pain and improve function of the affected joints. Most often, this is possible with a mixture of physical measures and drug therapy and, sometimes, surgery. Physical measures - Weight loss and exercise are useful in OA. Excess weight puts stress on your knee joints and hips and low back.  For every 10 pounds of weight you lose over 10 years, you can reduce the relieve pain and you have major loss of function. Surgery may involve arthroscopy, repair of the joint done through small incisions (cuts). If the joint damage cannot be repaired, you may need a joint replacement. Supplements - Many over-the-counter nutrition supplements have been used for treatment of OA. Most lack good research data to support their effectiveness and safety. Among the most widely used are glucosamine/chondroitin sulfate, calcium and vitamin D, and omega-3 fatty acids. To ensure safety and avoid drug interactions, consult your doctor or pharmacist before using any of these supplements. This is especially true when you are combining these supplements with prescribed drugs. Living with Osteoarthritis  There is no cure for OA, but you can manage how it affects your lifestyle. Some tips include:  Properly position and support your neck and back while sitting or sleeping. Adjust furniture, such as raising a chair or toilet seat. Avoid repeated motions of the joint, especially frequent bending. Lose weight if you are overweight or obese, which can reduce pain and slow progression of OA. Exercise each day. Use arthritis support devices that will help you do daily activities. You might want to work with a physical therapist or occupational therapist to learn the best exercises and to choose arthritis assistive devices. Points to remember  OA is the most common form of arthritis and can occur together with other types of arthritis. The goal of treatment in OA is to reduce pain and improve function. Exercise is an important part of OA treatment because it can decrease joint pain and improve function. At present, there is no treatment that can reverse the damage of OA in the joints. Researchers are trying to find ways to slow or reverse this joint damage.    The rheumatologist's role in the treatment of osteoarthritis  Rheumatologists are doctors who are experts in diagnosing and fibromyalgia? Fibromyalgia is a chronic health problem that causes pain all over the body and other symptoms. Other symptoms that patients most often have are:  Tenderness to touch or pressure affecting joints and muscles   Fatigue   Sleep problems (waking up unrefreshed)   Problems with memory or thinking clearly  Some patients also may have:  Depression or anxiety   Migraine or tension headaches   Digestive problems: irritable bowel syndrome (commonly called IBS) or gastroesophageal reflux disease (often referred to as GERD)   Irritable or overactive bladder   Pelvic pain   Temporomandibular disorder--often called TMJ (a set of symptoms including face or jaw pain, jaw clicking and ringing in the ears)  Symptoms of fibromyalgia and its related problems can vary in intensity, and will wax and wane over time. Stress often worsens the symptoms. What causes fibromyalgia? The causes of fibromyalgia are unclear. They may be different in different people. Fibromyalgia may run in families. There likely are certain genes that can make people more prone to getting fibromyalgia and the other health problems that can occur with it. Genes alone, though, do not cause fibromyalgia. There is most often some triggering factor that sets off fibromyalgia. It may be spine problems, arthritis, injury, or other type of physical stress. Emotional stress also may trigger this illness. The result is a change in the way the body \"talks\" with the spinal cord and brain. Levels of brain chemicals and proteins may change. For the person with fibromyalgia, it is as though the \"volume control\" is turned up too high in the brain's pain processing centers. WHO GETS FIBROMYALGIA? Fibromyalgia is most common in women, though it can occur in men. It most often starts in middle adulthood, but can occur in the teen years and in old age. Younger children can also develop widespread body pain and fatigue.   You are at higher risk for fibromyalgia Source: [employer] of Rheumatology, 2010   How is fibromyalgia treated? There is no cure for fibromyalgia. However, symptoms can be treated with both medication and non-drug treatments. Many times the best outcomes are achieved by using multiple types of treatments. Medications: The U.S. Food and Drug Administration has approved three drugs for the treatment of fibromyalgia. They include two drugs that change some of the brain chemicals (serotonin and norepinephrine) that help control pain levels: duloxetine (Cymbalta) and milnacipran (Savella). Older drugs that affect these same brain chemicals also may be used to treat fibromyalgia. These include amitriptyline (Elavil), cyclobenzaprine (Flexeril) or venlafaxine (Effexor). Side effects vary by the drug. Ask your doctor about the risks and benefits of your medicine. The other drug approved for fibromyalgia is pregabalin (Lyrica). Pregabalin and another drug, gabapentin (Neurontin), work by blocking the overactivity of nerve cells involved in pain transmission. These medicines may cause dizziness, sleepiness, swelling and weight gain. Though not recommended as the first treatment, tramadol (Ultram) may be used to treat fibromyalgia pain. This painkiller is an opioid narcotic. Doctors do not suggest using other opioids for treating fibromyalgia. This is not because of fears of dependence. Rather, evidence suggests these drugs are not of great benefit to most people with fibromyalgia. In fact, they may cause greater pain sensitivity or make pain persist.   In some cases, fibromyalgia pain can improve with use of over-the-counter medicines such as acetaminophen (Tylenol) or nonsteroidal anti-inflammatory drugs (commonly called NSAIDs) like ibuprofen (Advil, Motrin) or naproxen (Aleve, Anaprox). Yet, these drugs likely treat the pain triggers, rather than the fibromyalgia pain itself.  Thus, they are most useful in people who have other causes for pain such treatment. While difficult at first, regular exercise often reduces pain symptoms and fatigue. Patients should follow the saying, \"Start low, go slow. \" Slowly add daily fitness into your routine. For instance, take the stairs instead of the elevator, or park further away from the store. After awhile, do more physical activity. Add in some walking, swimming, water aerobics and/or stretching exercises. It takes time to create a comfortable routine. Just get moving, stay active and don't give up! Educate yourself. Nationally recognized organizations like the [facility] are great resources for information. Share this information with family, friends and co-workers. Points to remember  Look forward, not backward. Focus on what you need to do to get better, not what caused your illness. As your symptoms decrease with drug treatments, start increasing your activity. Begin to do things that you stopped doing because of your pain and other symptoms. With proper treatment and self-care, you can get better and live a normal life. The role of the rheumatologist  Fibromyalgia is not a form of arthritis (joint disease). It does not cause inflammation or damage to joints, muscles or other tissues. However, because fibromyalgia can cause chronic pain and fatigue similar to arthritis, some people may think of it as a rheumatic condition. As a result, often a rheumatologist detects this disease (and rules out other rheumatic diseases). Your primary care physician can provide all the other care and treatment of fibromyalgia that you need. To find a rheumatologist  For a listing of rheumatologists in your area, click here. For more information about rheumatologists click here. Fibromyalgia Network   www. fmnetnews. [address] of Arthritis and Musculoskeletal and Skin Diseases  www.niams. nih.gov/hi/topics/fibromyalgia/fibrofs. htm   National Fibromyalgia Association   www. [facility]. [address] . partnership.org   [address]. safund. org

## 2020-01-03 NOTE — PROGRESS NOTES
electronically/faxed to referring physician. #######################################################################    REASON FOR CONSULTATION:  Patient is being seen at the request of  Rita Munoz DO for evaluation of generalized musculoskeletal discomfort    HISTORY OF PRESENT ILLNESS:  71 y.o. female states that she has had chronic pain all over for several years, has been getting worse over time. Most bothersome area is neck, back, right shoulder, chest wall where she had incision for the drain and thoracotomy for mitral valve replacement, right knee and shin area-dull ache, worse with repetitive use especially in her shoulders. States that she worked at [employer] section at [employer] and as a , and would notice discomfort in her shoulders with repetitive use. She does not recall any swollen or inflamed joints. She does have scoliosis, was getting chiropractic manipulations in the past.  She was diagnosed with fibromyalgia, started Cymbalta, could not tolerate. Lyrica was started which has helped with her symptoms during the daytime, however forgets to take the evening dose and aches at night. She does not recall any swollen or inflamed joints. No focal muscle weakness. Is not up-to-date with age-specific cancer screening procedures. No psoriasis or inflammatory bowel diseases. Gained 30 pounds of weight since heart surgery last year. This weight gain was prior to starting Lyrica. Pertinent ROS: Denies weight loss, objective fever, skin rashes or skin thickening, photosensitivity Raynauds, focal alopecia, recurrent ocular congestion, dry eyes or mouth, or mucosal ulcers, tinnitus or recent hearing loss. Denies  palpitations, cough, pleurisy, dysphagia, or features of inflammatory bowel diseases. No h/o blood clots or bleeding disorders. No renal or genitourinary problems. No focal weakness or persistent paresthesia. All other ROS are negative.     Past Medical History:   Diagnosis Date Forced sexual activity: Not on file   Other Topics Concern    Not on file   Social History Narrative    Not on file       No family history of autoimmune diseases    Current Outpatient Medications   Medication Sig Dispense Refill    pregabalin (LYRICA) 150 MG capsule Take 1 capsule by mouth 2 times daily for 30 days. 60 capsule 0    diclofenac sodium 1 % GEL Apply 2 g topically 2 times daily 1 Tube 3    metoprolol succinate (TOPROL XL) 50 MG extended release tablet Take 1 tablet by mouth daily 90 tablet 1    atorvastatin (LIPITOR) 20 MG tablet Take 1 tablet by mouth nightly 90 tablet 1    lisinopril (PRINIVIL;ZESTRIL) 20 MG tablet Take 1 tablet by mouth daily 90 tablet 1    amLODIPine (NORVASC) 10 MG tablet Take 1 tablet by mouth every evening 90 tablet 1    aspirin 81 MG EC tablet Take 1 tablet by mouth daily 30 tablet 3     No current facility-administered medications for this visit. Allergies   Allergen Reactions    Hctz [Hydrochlorothiazide] Other (See Comments)     Low serum sodium       PHYSICAL EXAM:    Vitals:    /76   Ht 5' 2\" (1.575 m)   Wt 156 lb (70.8 kg)   BMI 28.53 kg/m²   General appearance/ Psychiatric: well nourished, and well groomed, normal judgement, alert, appears stated age and cooperative. MKS: Generalized hyperesthesia wherever I touch with all fibromyalgia tender points positive. Scoliosis in the spine  Mainly in the thoracic area. Mild limitation in abduction in both hips. Bony crepitus in both knees without any swelling. Subjective discomfort in pectoral area right side with range of motion in her shoulder-right, however has full range of motion. All other joints in upper and lower extremities are nontender, no swelling or synovitis, has full range of motion. Skin: No rashes, no induration or skin thickening or nodules. No evidence ischemia or deformities noted in digits or nails. HEENT: Normal lids, lacrimal glands and pupils. No oral or nasal ulcers. Salivary glands reveal no evidence of abnormality. External inspection of the ears and nose within normal limits. Neck: No masses or asymmetry. No thyroid enlargement. Chest: Normal effort, clear to auscultation. Heart:  Normal s1/s2, no leg edema. Neurologic: normal deep tendon reflexes. No foot or wrist drop. DATA:   Lab Results   Component Value Date    WBC 7.8 02/09/2018    HGB 8.6 (L) 02/09/2018    HCT 24.9 (L) 02/09/2018    MCV 88.8 02/09/2018     02/09/2018         Chemistry        Component Value Date/Time     04/09/2019 0856    K 4.2 04/09/2019 0856    K 4.1 02/09/2018 0418     04/09/2019 0856    CO2 23 04/09/2019 0856    BUN 5 (L) 04/09/2019 0856    CREATININE 0.6 04/09/2019 0856        Component Value Date/Time    CALCIUM 9.8 04/09/2019 0856    ALKPHOS 118 04/09/2019 0856    AST 25 04/09/2019 0856    ALT 16 04/09/2019 0856    BILITOT 0.6 04/09/2019 0856          Lab Results   Component Value Date    LABURIC 6.7 (H) 02/08/2018         A/P- See above.

## 2020-01-22 ENCOUNTER — OFFICE VISIT (OUTPATIENT)
Dept: FAMILY MEDICINE CLINIC | Age: 65
End: 2020-01-22
Payer: MEDICARE

## 2020-01-22 VITALS
SYSTOLIC BLOOD PRESSURE: 122 MMHG | HEART RATE: 86 BPM | BODY MASS INDEX: 28.52 KG/M2 | DIASTOLIC BLOOD PRESSURE: 84 MMHG | OXYGEN SATURATION: 97 % | WEIGHT: 155 LBS | HEIGHT: 62 IN

## 2020-01-22 PROCEDURE — 3017F COLORECTAL CA SCREEN DOC REV: CPT | Performed by: FAMILY MEDICINE

## 2020-01-22 PROCEDURE — 4040F PNEUMOC VAC/ADMIN/RCVD: CPT | Performed by: FAMILY MEDICINE

## 2020-01-22 PROCEDURE — 1123F ACP DISCUSS/DSCN MKR DOCD: CPT | Performed by: FAMILY MEDICINE

## 2020-01-22 PROCEDURE — G0402 INITIAL PREVENTIVE EXAM: HCPCS | Performed by: FAMILY MEDICINE

## 2020-01-22 ASSESSMENT — LIFESTYLE VARIABLES: HOW OFTEN DO YOU HAVE A DRINK CONTAINING ALCOHOL: 0

## 2020-01-22 ASSESSMENT — PATIENT HEALTH QUESTIONNAIRE - PHQ9
SUM OF ALL RESPONSES TO PHQ QUESTIONS 1-9: 1
SUM OF ALL RESPONSES TO PHQ QUESTIONS 1-9: 1

## 2020-01-22 NOTE — PROGRESS NOTES
Medical History:   Diagnosis Date    Anxiety     ETOH abuse     Hyperlipidemia     Hypertension        Past Surgical History:   Procedure Laterality Date    BUNIONECTOMY      CARDIAC CATHETERIZATION  01/15/2018    Dr. Jeanne Cramer  02/05/2018    Dr. Roge Garcia - Minimal access using Physio ring size 32 & new chordae x2, placement of temporary pacing wire    TRANSESOPHAGEAL ECHOCARDIOGRAM  02/05/2018    during mitral valvuloplasty         Family History   Problem Relation Age of Onset    High Blood Pressure Mother     Diabetes Mother        CareTeam (Including outside providers/suppliers regularly involved in providing care):   Patient Care Team:  Martha Okeefe DO as PCP - [address] [place], [place] [address]DO as PCP [facility]  Madiha Castle MD as Surgeon (Orthopedic Surgery)    Wt Readings from Last 3 Encounters:   01/22/20 155 lb (70.3 kg)   01/03/20 156 lb (70.8 kg)   11/13/19 150 lb (68 kg)     Vitals:    01/22/20 0913   BP: 122/84   Site: Left Upper Arm   Position: Sitting   Cuff Size: Medium Adult   Pulse: 86   SpO2: 97%   Weight: 155 lb (70.3 kg)   Height: 5' 2\" (1.575 m)     Body mass index is 28.35 kg/m². Based upon direct observation of the patient, evaluation of cognition reveals intact    Well developed well nourished patient in no acute distress. Alert and oriented to person, place, and time. HEENT:Normocephalic, atraumatic. No scleral icterus. Pupils normal  No lymphadenopathy. Heart: Regular Rate and Rhythm. No murmurs. Respirations: lungs clear to auscultation bilaterally. Abdomin: Soft. No masses. No tenderness. No rebound, no rigidity, no guarding. Bowel sounds present. Extremities: No peripheral edema. No erythema. decreased rom right shoulder        Patient's complete Health Risk Assessment and screening values have been reviewed and are found in Flowsheets.  The following problems were reviewed today and where indicated follow up appointments

## 2020-01-22 NOTE — PATIENT INSTRUCTIONS
Personalized Preventive Plan for Zina Klein - 1/22/2020  Medicare offers a range of preventive health benefits. Some of the tests and screenings are paid in full while other may be subject to a deductible, co-insurance, and/or copay. Some of these benefits include a comprehensive review of your medical history including lifestyle, illnesses that may run in your family, and various assessments and screenings as appropriate. After reviewing your medical record and screening and assessments performed today your provider may have ordered immunizations, labs, imaging, and/or referrals for you. A list of these orders (if applicable) as well as your Preventive Care list are included within your After Visit Summary for your review. Other Preventive Recommendations:    · A preventive eye exam performed by an eye specialist is recommended every 1-2 years to screen for glaucoma; cataracts, macular degeneration, and other eye disorders. · A preventive dental visit is recommended every 6 months. · Try to get at least 150 minutes of exercise per week or 10,000 steps per day on a pedometer . · Order or download the FREE \"Exercise & Physical Activity: Your Everyday Guide\" from [employer]. Call 1-996.814.1668 or search [employer] online. · You need 6124-0440 mg of calcium and 5125-7562 IU of vitamin D per day. It is possible to meet your calcium requirement with diet alone, but a vitamin D supplement is usually necessary to meet this goal.  · When exposed to the sun, use a sunscreen that protects against both UVA and UVB radiation with an SPF of 30 or greater. Reapply every 2 to 3 hours or after sweating, drying off with a towel, or swimming. · Always wear a seat belt when traveling in a car. Always wear a helmet when riding a bicycle or motorcycle. Your fasting blood sugar should be below 100.    If it is between 100-125 that is considered high and known as prediabetes, stroke are two common complications of diabetes, so keeping blood pressure in check is important. Grains  Best options: When choosing grains, make sure theyre whole. Decrease the amount of foods which contain flour. Whole grains such as wild rice, quinoa, and whole grain breads and cereals contain fiber, which is beneficial for digestive health, but often the grains flour products raise your blood sugar and when your blood sugar returns to normal, it can trigger the desire to eat again. Worst options: Refined white flour doesnt contain the same health benefits as whole grains. Processed foods made with white flour include breakfast cereals, white bread, and pastries, cookies, muffins, pretzels, crackers, so avoid these options. Also try to steer clear of white rice and pasta. Dairy  Best options: With only 6 to 8 grams of carbohydrates in a serving, plain nonfat [place] yogurt is a healthy and versatile dairy option. You can add berries and enjoy it for dessert or breakfast; you can use it in recipes as a replacement for sour cream, which is high in saturated fat. Skim milk. Worst options: Avoid all full-fat dairy products and especially packaged chocolate milk, as it also has added sugar. Avoid yogurts with added sugar. Vegetables  Best options: Non-starchy vegetables such as leafy greens, broccoli, cauliflower, asparagus, and carrots are low in carbohydrates and high in fiber and other nutrients, Flavio Quan says. You can eat non-starchy vegetables in abundance -- half of your plate should be filled with these veggies. If youre craving mashed potatoes, give mashed cauliflower a try. Worst options: Stick to small portions of starchy vegetables such as corn, potatoes, and peas. These items are nutritious, but should be eaten in moderation. The ADA groups them with grains because of their high carb content.   Fruit  Best options: Fresh fruit can conquer your craving for sweets while providing antioxidants and take 12 weeks to break old habits before you feel like you are in a new routine. Then you have to live your new lifestyle. Once you are adjusted to your new habits you will not have to keep recording your daily intake, you should have a good idea of what is a normal amount for you to eat each day. If you are obese, have elevated blood sugar, heart disease or a medical condition that is worsened by excess weight, I can refer you to a [employer] for education, usually covered by insurance. ·   Living Will    A living will is a legal form you use to write down the kind of care you want at the end of your life. It is used by the health professionals who will treat you if you aren't able to decide for yourself. If you put your wishes in writing, your loved ones and others will know what kind of care you want. They won't need to guess. This can ease your mind and be helpful to others. A living will is not the same as an estate or property will. An estate will explains what you want to happen with your money and property after you die. Is a living will a legal document? A living will is a legal document. Each state has its own laws about living sanchez. If you move to another state, make sure that your living will is legal in the state where you now live. Or you might use a universal form that has been approved by many states. This kind of form can sometimes be completed and stored online. Your electronic copy will then be available wherever you have a connection to the Internet. In most cases, doctors will respect your wishes even if you have a form from a different state. You don't need an  to complete a living will. But legal advice can be helpful if your state's laws are unclear, your health history is complicated, or your family can't agree on what should be in your living will. You can change your living will at any time.  Some people find that their wishes about end-of-life care change as their health changes. In addition to making a living will, think about completing a medical power of  form. This form lets you name the person you want to make end-of-life treatment decisions for you (your \"health care agent\") if you're not able to. Many hospitals and nursing homes will give you the forms you need to complete a living will and a medical power of . Your living will is used only if you can't make or communicate decisions for yourself anymore. If you become able to make decisions again, you can accept or refuse any treatment, no matter what you wrote in your living will. Your state may offer an online registry. This is a place where you can store your living will online so the doctors and nurses who need to treat you can find it right away. What should you think about when creating a living will? Talk about your end-of-life wishes with your family members and your doctor. Let them know what you want. That way the people making decisions for you won't be surprised by your choices. Think about these questions as you make your living will:  Do you know enough about life support methods that might be used? If not, talk to your doctor so you know what might be done if you can't breathe on your own, your heart stops, or you're unable to swallow. What things would you still want to be able to do after you receive life-support methods? Would you want to be able to walk? To speak? To eat on your own? To live without the help of machines? If you have a choice, where do you want to be cared for? In your home? At a hospital or nursing home? Do you want certain Yazidi practices performed if you become very ill? If you have a choice at the end of your life, where would you prefer to die? At home? In a hospital or nursing home? Somewhere else? Would you prefer to be buried or cremated? Do you want your organs to be donated after you die? What should you do with your living will?   Make sure that your family members and your health care agent have copies of your living will. Give your doctor a copy of your living will to keep in your medical record. If you have more than one doctor, make sure that each one has a copy. You may want to put a copy of your living will where it can be easily found. Where can you learn more? Go to [email or web address] org and sign in to your [employer] account. Enter K977 in the [employer]xEye box to learn more about \"Learning About Living Perroy. \"     If you do not have an account, please click on the \"Sign Up Now\" link. Current as of: April 19, 2018  Content Version: 11.8  © 5557-0027 Healthwise, [employer]. Care instructions adapted under license by [facility] ([facility]). If you have questions about a medical condition or this instruction, always ask your healthcare professional. [address] any warranty or liability for your use of this information. FALL PREVENTION    Getting around your home safely can be a challenge if you have injuries or health problems that make it easy for you to fall. Loose rugs and furniture in walkways are among the dangers for many older people who have problems walking or who have poor eyesight. People who have conditions such as arthritis, osteoporosis, or dementia also have to be careful not to fall. You can make your home safer with a few simple measures. Follow-up care is a key part of your treatment and safety. Be sure to make and go to all appointments, and call your doctor if you are having problems. It's also a good idea to know your test results and keep a list of the medicines you take. How can you care for yourself at home? Taking care of yourself  You may get dizzy if you do not drink enough water. To prevent dehydration, drink plenty of fluids, enough so that your urine is light yellow or clear like water. Choose water and other caffeine-free clear liquids.  If you have kidney, heart, or liver disease and have to limit fluids, talk with your doctor before you increase the amount of fluids you drink. Exercise regularly to improve your strength, muscle tone, and balance. Walk if you can. Swimming may be a good choice if you cannot walk easily. Have your vision and hearing checked each year or any time you notice a change. If you have trouble seeing and hearing, you might not be able to avoid objects and could lose your balance. Know the side effects of the medicines you take. Ask your doctor or pharmacist whether the medicines you take can affect your balance. Sleeping pills or sedatives can affect your balance. Limit the amount of alcohol you drink. Alcohol can impair your balance and other senses. Ask your doctor whether calluses or corns on your feet need to be removed. If you wear loose-fitting shoes because of calluses or corns, you can lose your balance and fall. Preventing falls at home  Remove raised doorway thresholds, throw rugs, and clutter. Repair loose carpet or raised areas in the floor. Move furniture and electrical cords to keep them out of walking paths. Use nonskid floor wax, and wipe up spills right away, especially on ceramic tile floors. If you use a walker or cane, put rubber tips on it. If you use crutches, clean the bottoms of them regularly with an abrasive pad, such as steel wool. Keep your house well lit, especially stairways, porches, and outside walkways. Use night-lights in areas such as hallways and bathrooms. Add extra light switches or use remote switches (such as switches that go on or off when you clap your hands) to make it easier to turn lights on if you have to get up during the night. Install sturdy handrails on stairways. Move items in your cabinets so that the things you use a lot are on the lower shelves (about waist level). Keep a cordless phone and a flashlight with new batteries by your bed.  If possible, put a phone in each

## 2020-01-28 ENCOUNTER — OFFICE VISIT (OUTPATIENT)
Dept: ORTHOPEDIC SURGERY | Age: 65
End: 2020-01-28
Payer: MEDICARE

## 2020-01-28 VITALS — HEIGHT: 62 IN | WEIGHT: 154.98 LBS | BODY MASS INDEX: 28.52 KG/M2

## 2020-01-28 PROCEDURE — G8427 DOCREV CUR MEDS BY ELIG CLIN: HCPCS | Performed by: ORTHOPAEDIC SURGERY

## 2020-01-28 PROCEDURE — 1090F PRES/ABSN URINE INCON ASSESS: CPT | Performed by: ORTHOPAEDIC SURGERY

## 2020-01-28 PROCEDURE — 99202 OFFICE O/P NEW SF 15 MIN: CPT | Performed by: ORTHOPAEDIC SURGERY

## 2020-01-28 PROCEDURE — G8484 FLU IMMUNIZE NO ADMIN: HCPCS | Performed by: ORTHOPAEDIC SURGERY

## 2020-01-28 PROCEDURE — G8417 CALC BMI ABV UP PARAM F/U: HCPCS | Performed by: ORTHOPAEDIC SURGERY

## 2020-01-28 PROCEDURE — 20610 DRAIN/INJ JOINT/BURSA W/O US: CPT | Performed by: ORTHOPAEDIC SURGERY

## 2020-01-28 RX ORDER — TRIAMCINOLONE ACETONIDE 40 MG/ML
80 INJECTION, SUSPENSION INTRA-ARTICULAR; INTRAMUSCULAR ONCE
Status: COMPLETED | OUTPATIENT
Start: 2020-01-28 | End: 2020-01-28

## 2020-01-28 RX ADMIN — TRIAMCINOLONE ACETONIDE 80 MG: 40 INJECTION, SUSPENSION INTRA-ARTICULAR; INTRAMUSCULAR at 09:53

## 2020-01-28 NOTE — PROGRESS NOTES
SHOULDER CONSULTATION    Referring Provider: Dr. Gali Lockwood    Primary Care Provider: Same    Chief Complaint    New Patient (right shoulder )      History of Present Illness:  Francena Leyden is a 71 y.o. female who presents today for new patient evaluation right shoulder consultation. She is a very pleasant 57-year-old right-hand-dominant female with a history of multiple sports injuries and repetitive overuse labor to her right upper extremity. In addition she has a significant medical history including open heart surgery with valve replacement. She reports some difficulty raising her arm particularly at and above 90 degrees. She notices some weakness. She has some moderate night pain. Pain radiating into the deltoid. Pain Assessment  Location of Pain: Shoulder  Location Modifiers: Right  Quality of Pain: Sharp, Throbbing  Duration of Pain: Persistent  Frequency of Pain: Intermittent  Aggravating Factors: Straightening, Stretching, Bending, Exercise  Limiting Behavior: Yes  Relieving Factors: Rest  Result of Injury: Yes  Work-Related Injury: No    Medical History:  Patient's medications, allergies, past medical, surgical, social and family histories were reviewed and updated as appropriate. Review of Systems:  Pertinent items are noted in HPI  Review of systems reviewed from Patient History Form dated on January 28 and available in the patient's chart under the Media tab. Vitals:    01/28/20 0904   Weight: 154 lb 15.7 oz (70.3 kg)   Height: 5' 2.01\" (1.575 m)     On inspection today she does have some mild visible atrophy      General Exam:   Constitutional: Patient is adequately groomed with no evidence of malnutrition  Mental Status: The patient is oriented to time, place and person. The patient's mood and affect are appropriate. Vascular: Examination reveals no swelling or calf tenderness. Peripheral pulses are palpable and 2+. Neurological: The patient has good coordination.   There is no weakness or sensory deficit. Shoulder Examination:    Inspection: In the supraspinatus fossa.,  Moderate resting protraction of the scapula in fact she has some active medial dyskinesis. Palpation: Moderate tenderness at the greater tuberosity and coracoacromial arch    Range of Motion: Range of movement demonstrates a forward elevation of 140, abduction of 100, X rotation of 60, internal rotation to L3. Strength: Strength is 4/5 although she does have a good belly press. Special Tests: She has pain with dynamic active compression, pain with resisted external rotation. Skin: There are no rashes, ulcerations or lesions. Gait: No assist device    Spine good cervical rotation    Additional Comments:         Radiology:     X-rays obtained reviewed in the office today include 4 views of the right shoulder. These demonstrate the findings of some cortical thinning, intramedullary changes, mild arthritis. Notably some narrowing of the subacromial space and flattening of the tuberosity consistent with chronic cuff pathology. Assessment : My clinical impression is a a chronic degenerative rotator cuff tear. Office Procedures:After careful consideration of the risks and benefits, the right shoulder   subacromial   joint was injected under sterile conditions and well-tolerated. The skin was sterilized initially with alcohol and subsequently with Betadine. 80 mg of triamcinalone and 2 mL of quarter percent plain Marcaine were utilized and injected with a 21-gauge needle. Injection was well tolerated. No local skin reactions. No adverse events. Initial response assessed. Please see associated injection template for [facility] #.   Orders Placed This Encounter   Procedures    XR SHOULDER RIGHT (MIN 2 VIEWS)     Standing Status:   Future     Number of Occurrences:   1     Standing Expiration Date:   2/28/2020   Luc Valverde [facility] Physical [facility]     Referral Priority:   Routine     Referral

## 2020-02-05 RX ORDER — LISINOPRIL 20 MG/1
20 TABLET ORAL DAILY
Qty: 90 TABLET | Refills: 3 | Status: SHIPPED | OUTPATIENT
Start: 2020-02-05 | End: 2020-07-17 | Stop reason: SDUPTHER

## 2020-04-07 ENCOUNTER — TELEPHONE (OUTPATIENT)
Dept: CARDIOLOGY CLINIC | Age: 65
End: 2020-04-07

## 2020-04-07 RX ORDER — AMLODIPINE BESYLATE 10 MG/1
TABLET ORAL
Qty: 90 TABLET | Refills: 0 | Status: SHIPPED | OUTPATIENT
Start: 2020-04-07 | End: 2020-07-17 | Stop reason: SDUPTHER

## 2020-05-26 RX ORDER — METOPROLOL SUCCINATE 50 MG/1
TABLET, EXTENDED RELEASE ORAL
Qty: 90 TABLET | Refills: 0 | Status: SHIPPED | OUTPATIENT
Start: 2020-05-26 | End: 2020-05-27

## 2020-06-16 NOTE — PROGRESS NOTES
[facility]  Clinic Note     1/17/2018 4:18 PM  Surgeon:  Sanchez Shelton     S/P :  Pre-op severe mitral regurg    Chief complaint : Preop follow-up test results discussion  Subjective:  Ms. Lopez Arrant   More short of breath as the day goes  Vital Signs: BP (!) 170/118 (Site: Right Arm, Position: Sitting, Cuff Size: Medium Adult)   Pulse 113   Temp 97.6 °F (36.4 °C) (Oral)   Ht 5' 2\" (1.575 m)   Wt 133 lb 8 oz (60.6 kg)   SpO2 97%   BMI 24.42 kg/m²      I/O:  No intake or output data in the 24 hours ending 01/17/18 0917    Exam:   Cardiovascular: S1 plus S2 +0  Pulmonary: Bilaterally clear no additional sounds  Cardiac catheterization  IMPRESSION:  1. Angiographically normal coronary arteries. 2.  Severe eccentric mitral regurgitation from flail posterior leaflet. 3.  Elevated left ventricular filling pressure. 4.  Normal left ventricular systolic function.        RECOMMENDATION:  The patient has no coronary artery disease. She can  proceed with mitral valve surgery.     Transesophageal echo  Normal left ventricular systolic function, with estimated ejection fraction   of 55%.   Mild thickening of the mitral leaflets.   Flail posterior mitral leaflet.   Eccentric anteriorly directed severe mitral regurgitation consistent with a   flail posterior leaflet.   Systolic reversal noted in left upper pulmonary vein.   Severely dilated left atrium.   Mild to moderate tricuspid regurgitation.   Systolic pulmonary artery pressure (SPAP) estimated at 51 mmHg consistent   with moderate pulmonary hypertension (RA pressure 8 mmHg).  Moderate left pleural effusion. CT chest  1. Severe cardiomegaly.  Small pericardial effusion. 2. Small right and trace left pleural effusions.    3. 4.0 cm fusiform ascending thoracic aortic aneurysm.           Ultrasound liver  Suspected new liver nodule, with nodular density near the pancreatic head of   uncertain etiology.  Recommend abdomen and pelvis CT with oral no

## 2020-07-14 ENCOUNTER — TELEPHONE (OUTPATIENT)
Dept: CARDIOLOGY CLINIC | Age: 65
End: 2020-07-14

## 2020-07-14 NOTE — TELEPHONE ENCOUNTER
Pt was told she needs lab work done before her next appt. Pt did have an appt w/npdd on7/14 but pt didn't know about the lab work, until yesterday. Pt fasted and went to a [employer] lab this morning but there was no order in her chart so she could get the labs done. Pt has already broke her fast, so she can go get them another day. Please put order in chart and pt has resched her appt. Pt will be going to have her labs done in the morning Wed 7/15th unless otherwise told different. Pt resched her appt for this Fri 7/17th.

## 2020-07-14 NOTE — TELEPHONE ENCOUNTER
Orders were placed 2019 so have since . New orders placed for CMP and lipid panel.    TIFFANIE Lagos - CNP

## 2020-07-15 DIAGNOSIS — E78.2 MIXED HYPERLIPIDEMIA: ICD-10-CM

## 2020-07-15 DIAGNOSIS — I34.0 SEVERE MITRAL REGURGITATION: ICD-10-CM

## 2020-07-15 DIAGNOSIS — I10 ESSENTIAL HYPERTENSION: ICD-10-CM

## 2020-07-15 DIAGNOSIS — Z98.890 S/P MVR (MITRAL VALVE REPAIR): ICD-10-CM

## 2020-07-15 LAB
A/G RATIO: 1.7 (ref 1.1–2.2)
ALBUMIN SERPL-MCNC: 4.6 G/DL (ref 3.4–5)
ALP BLD-CCNC: 101 U/L (ref 40–129)
ALT SERPL-CCNC: 13 U/L (ref 10–40)
ANION GAP SERPL CALCULATED.3IONS-SCNC: 15 MMOL/L (ref 3–16)
AST SERPL-CCNC: 19 U/L (ref 15–37)
BILIRUB SERPL-MCNC: 0.5 MG/DL (ref 0–1)
BUN BLDV-MCNC: 8 MG/DL (ref 7–20)
CALCIUM SERPL-MCNC: 9.8 MG/DL (ref 8.3–10.6)
CHLORIDE BLD-SCNC: 97 MMOL/L (ref 99–110)
CHOLESTEROL, TOTAL: 226 MG/DL (ref 0–199)
CO2: 23 MMOL/L (ref 21–32)
CREAT SERPL-MCNC: 0.6 MG/DL (ref 0.6–1.2)
GFR AFRICAN AMERICAN: >60
GFR NON-AFRICAN AMERICAN: >60
GLOBULIN: 2.7 G/DL
GLUCOSE BLD-MCNC: 94 MG/DL (ref 70–99)
HDLC SERPL-MCNC: 76 MG/DL (ref 40–60)
LDL CHOLESTEROL CALCULATED: 122 MG/DL
POTASSIUM SERPL-SCNC: 4.9 MMOL/L (ref 3.5–5.1)
SODIUM BLD-SCNC: 135 MMOL/L (ref 136–145)
TOTAL PROTEIN: 7.3 G/DL (ref 6.4–8.2)
TRIGL SERPL-MCNC: 141 MG/DL (ref 0–150)
VLDLC SERPL CALC-MCNC: 28 MG/DL

## 2020-07-17 ENCOUNTER — OFFICE VISIT (OUTPATIENT)
Dept: CARDIOLOGY CLINIC | Age: 65
End: 2020-07-17
Payer: MEDICARE

## 2020-07-17 VITALS
WEIGHT: 157 LBS | BODY MASS INDEX: 28.89 KG/M2 | HEIGHT: 62 IN | OXYGEN SATURATION: 98 % | HEART RATE: 83 BPM | SYSTOLIC BLOOD PRESSURE: 122 MMHG | DIASTOLIC BLOOD PRESSURE: 64 MMHG

## 2020-07-17 PROCEDURE — 99213 OFFICE O/P EST LOW 20 MIN: CPT | Performed by: NURSE PRACTITIONER

## 2020-07-17 PROCEDURE — 1090F PRES/ABSN URINE INCON ASSESS: CPT | Performed by: NURSE PRACTITIONER

## 2020-07-17 PROCEDURE — 4040F PNEUMOC VAC/ADMIN/RCVD: CPT | Performed by: NURSE PRACTITIONER

## 2020-07-17 PROCEDURE — G8417 CALC BMI ABV UP PARAM F/U: HCPCS | Performed by: NURSE PRACTITIONER

## 2020-07-17 PROCEDURE — 3017F COLORECTAL CA SCREEN DOC REV: CPT | Performed by: NURSE PRACTITIONER

## 2020-07-17 PROCEDURE — 1123F ACP DISCUSS/DSCN MKR DOCD: CPT | Performed by: NURSE PRACTITIONER

## 2020-07-17 PROCEDURE — 1036F TOBACCO NON-USER: CPT | Performed by: NURSE PRACTITIONER

## 2020-07-17 PROCEDURE — G8427 DOCREV CUR MEDS BY ELIG CLIN: HCPCS | Performed by: NURSE PRACTITIONER

## 2020-07-17 PROCEDURE — G8400 PT W/DXA NO RESULTS DOC: HCPCS | Performed by: NURSE PRACTITIONER

## 2020-07-17 RX ORDER — AMLODIPINE BESYLATE 10 MG/1
10 TABLET ORAL DAILY
Qty: 90 TABLET | Refills: 3 | Status: SHIPPED | OUTPATIENT
Start: 2020-07-17 | End: 2022-04-12 | Stop reason: SDUPTHER

## 2020-07-17 RX ORDER — ATORVASTATIN CALCIUM 20 MG/1
20 TABLET, FILM COATED ORAL DAILY
Qty: 90 TABLET | Refills: 3 | Status: SHIPPED | OUTPATIENT
Start: 2020-07-17 | End: 2022-03-03 | Stop reason: SDUPTHER

## 2020-07-17 RX ORDER — LISINOPRIL 20 MG/1
20 TABLET ORAL DAILY
Qty: 90 TABLET | Refills: 3 | Status: SHIPPED | OUTPATIENT
Start: 2020-07-17 | Stop reason: SDUPTHER

## 2020-07-17 RX ORDER — METOPROLOL SUCCINATE 25 MG/1
25 TABLET, EXTENDED RELEASE ORAL DAILY
Qty: 90 TABLET | Refills: 3 | Status: SHIPPED | OUTPATIENT
Start: 2020-07-17 | End: 2021-11-22

## 2020-07-17 RX ORDER — ATORVASTATIN CALCIUM 20 MG/1
20 TABLET, FILM COATED ORAL DAILY
Qty: 90 TABLET | Refills: 1 | Status: CANCELLED | OUTPATIENT
Start: 2020-07-17

## 2020-07-17 ASSESSMENT — ENCOUNTER SYMPTOMS
BACK PAIN: 1
SHORTNESS OF BREATH: 0
COUGH: 0

## 2020-07-17 NOTE — PROGRESS NOTES
[facility], [place]   Cardiac Evaluation    Primary Care Doctor:  New Torres DO    Chief Complaint   Patient presents with    Hypertension    Hyperlipidemia    Leg Pain    1 Year Follow Up     Last seen 3/2019        History of Present Illness:   I had the pleasure of seeing Tammy Park in follow up for MR s/p MVr, HTN, HLD and hx of ETOH with hyponatremia. She was last seen in March 2019. At that time it was recommended to restart the Toprol and Lipitor (ran out of). Her most recent blood work reviewed, improved lipid panel. She is having pain in right shoulder, was seen by rheumatology and ortho (received an injection in January with improvement for about 4 months). She is also having back pain, follows with chiropractor for this. She works as  at [employer]. After about 4 hours of standing and twisting, she has lower back pain that radiates down her legs. She has some fatigue later in the day. She complains of decreased energy. She has not been as active recently. Her sleep is poor, a night owl and trouble staying asleep, awakens for nocturia and  snoring. She remains a non-drinker but is eating more sweets which she feels is causing her weight gain. Nannette Cabrera describes symptoms including fatigue but denies chest pain, dyspnea, palpitations, orthopnea, PND, early saiety, edema, syncope. NYHA:   II  ACC/ AHA Stage:    C    Past Medical History:   has a past medical history of Anxiety, ETOH abuse, Hyperlipidemia, and Hypertension. Surgical History:   has a past surgical history that includes Bunionectomy; Mitral valvuloplasty (02/05/2018); transesophageal echocardiogram (02/05/2018); and Cardiac catheterization (01/15/2018). Social History:   reports that she quit smoking about 41 years ago. Her smoking use included cigarettes. She started smoking about 50 years ago. She has a 4.50 pack-year smoking history.  She has never used smokeless tobacco. She reports current alcohol use of about 15.0 standard drinks of alcohol per week. She reports that she does not use drugs. Family History:   Family History   Problem Relation Age of Onset    High Blood Pressure Mother     Diabetes Mother        Home Medications:  Prior to Admission medications    Medication Sig Start Date End Date Taking? Authorizing Provider   atorvastatin (LIPITOR) 20 MG tablet TAKE ONE TABLET BY MOUTH ONCE NIGHTLY 6/1/20  Yes TIFFANIE Younger CNP   metoprolol succinate (TOPROL XL) 50 MG extended release tablet TAKE ONE TABLET BY MOUTH DAILY 5/27/20  Yes Chad Lay, DO   amLODIPine (NORVASC) 10 MG tablet TAKE ONE TABLET BY MOUTH EVERY EVENING 4/7/20  Yes Akila Rosaia, APRN - CNP   lisinopril (PRINIVIL;ZESTRIL) 20 MG tablet Take 1 tablet by mouth daily 2/5/20  Yes Chad Rosanne, DO   diclofenac sodium 1 % GEL Apply 2 g topically 2 times daily 1/22/20  Yes Chad Lay, DO   aspirin 81 MG EC tablet Take 1 tablet by mouth daily 2/10/18  Yes Lorena Cline APRN - CNP   pregabalin (LYRICA) 150 MG capsule Take 1 capsule by mouth 2 times daily for 30 days. 11/13/19 1/3/20  Chad Lay, DO        Allergies:  Hctz [hydrochlorothiazide]     Review of Systems:   Review of Systems   Constitutional: Positive for appetite change and fatigue. Negative for activity change, chills, diaphoresis and fever. Respiratory: Negative for cough and shortness of breath. Cardiovascular: Negative for chest pain, palpitations and leg swelling. Musculoskeletal: Positive for arthralgias and back pain. Neurological: Positive for weakness. Negative for dizziness, syncope and light-headedness. Psychiatric/Behavioral: Positive for sleep disturbance.         Physical Examination:    Vitals:    07/17/20 1427   BP: 122/64   Pulse: 83   SpO2: 98%   Weight: 157 lb (71.2 kg)   Height: 5' 2\" (1.575 m)        Constitutional and General Appearance: Warm and dry, no apparent distress, normal coloration  HEENT: Normocephalic, atraumatic  Respiratory:  · Normal excursion and expansion without use of accessory muscles  · Resp Auscultation: Clear to auscultation   Cardiovascular:  · The apical impulses not displaced  · Heart tones are crisp and normal  · JVP 8 cm H2O  · Regular rate and rhythm, normal S1S2, no m/g/r  · Peripheral pulses are symmetrical and full  · There is no clubbing, cyanosis of the extremities.   · No BLE edema  · Pedal Pulses: 2+ and equal   Abdomen:  · No masses or tenderness  · Liver/Spleen: No Abnormalities Noted  Neurological/Psychiatric:  · Alert and oriented in all spheres  · Moves all extremities well  · Exhibits normal gait balance and coordination  · No abnormalities of mood, affect, memory, mentation, or behavior are noted    Lab Data:  Most recent lab results below reviewed in office    CBC:   Lab Results   Component Value Date    WBC 7.8 02/09/2018    WBC 10.5 02/08/2018    WBC 9.0 02/07/2018    RBC 2.80 02/09/2018    RBC 3.09 02/08/2018    RBC 2.92 02/07/2018    HGB 8.6 02/09/2018    HGB 9.4 02/08/2018    HGB 9.0 02/07/2018    HCT 24.9 02/09/2018    HCT 27.4 02/08/2018    HCT 26.0 02/07/2018    MCV 88.8 02/09/2018    MCV 88.6 02/08/2018    MCV 88.9 02/07/2018    RDW 13.7 02/09/2018    RDW 14.0 02/08/2018    RDW 13.9 02/07/2018     02/09/2018     02/08/2018     02/07/2018     BMP:  Lab Results   Component Value Date     07/15/2020     04/09/2019     04/18/2018    K 4.9 07/15/2020    K 4.2 04/09/2019    K 4.2 04/18/2018    K 4.1 02/09/2018    K 4.7 02/08/2018    K 5.2 02/08/2018    CL 97 07/15/2020     04/09/2019    CL 92 04/18/2018    CO2 23 07/15/2020    CO2 23 04/09/2019    CO2 22 04/18/2018    PHOS 4.2 12/17/2017    PHOS 3.6 12/16/2017    PHOS 3.3 12/15/2017    BUN 8 07/15/2020    BUN 5 04/09/2019    BUN 10 04/18/2018    CREATININE 0.6 07/15/2020    CREATININE 0.6 04/09/2019    CREATININE 0.6 04/18/2018     BNP:   Lab Results   Component Value Date    PROBNP 4,299 12/14/2017     LIPID:   Lab Results   Component Value Date    TRIG 141 07/15/2020    TRIG 114 04/09/2019    HDL 76 07/15/2020    HDL 89 04/09/2019    LDLCALC 122 07/15/2020    LDLCALC 164 04/09/2019       Cardiac Imaging:  Echo Oct 2019:   Left ventricular systolic function is normal with a visually estimated ejection fraction of 55%. The left ventricle is small in size with mild to moderate assymetric hypertrophy. No regional wall motion abnormalities are noted. Normal left ventricular diastolic function. Right ventricular systolic function is reduced. The left atrium appears mildly enlarged. An annuloplasty ring is seen in the mitral position. There is no evidence of mitral valve regurgitation. Systolic pulmonary artery pressure (SPAP) is normal and estimated at 20 mmHg (right atrial pressure 3 mmHg). Echo 12/14/17:   Left ventricular systolic function is normal with ejection fraction estimated at 55%. No regional wall motion abnormalities. Elevated left ventricular diastolic filling pressure. Severely dilated left atrium. Right atrium is mildly dilated. There is a flail posterior mitral leaflet. Eccentric anteriorly directed severe mitral regurgitation consistent with a flail posterior leaflet. Moderate tricuspid regurgitation. Systolic pulmonary artery pressure (SPAP) estimated at 82 mmHg consistent with severe pulmonary hypertension (RA pressure 15 mmHg). IVC size is dilated (>2.1 cm) and collapses < 50% with respiration consistent with markedly elevated RA pressure (15 mmHg). RITA 12/15/17:    Normal left ventricular systolic function, with estimated ejection fraction of 55%. Mild thickening of the mitral leaflets. Flail posterior mitral leaflet. Eccentric anteriorly directed severe mitral regurgitation consistent with a flail posterior leaflet. Systolic reversal noted in left upper pulmonary vein. Severely dilated left atrium.    Mild to moderate tricuspid regurgitation. Systolic pulmonary artery pressure (SPAP) estimated at 51 mmHg consistent with moderate pulmonary hypertension (RA pressure 8 mmHg). Moderate left pleural effusion. CARDIAC CATH 1/15/18: IMPRESSION:  1. Angiographically normal coronary arteries. 2.  Severe eccentric mitral regurgitation from flail posterior leaflet. 3.  Elevated left ventricular filling pressure. 4.  Normal left ventricular systolic function. RECOMMENDATION:  The patient has no coronary artery disease. She can proceed with mitral valve surgery. Surgery: 2/5/18  Mitral valve repair using new chordae ×2 for P2 physio-Ring 32    Assessment:    1. Severe mitral regurgitation    2. S/P MVR (mitral valve repair)    3. Essential hypertension    4. Mixed hyperlipidemia          Plan:   1. Decrease the metoprolol (Toprol XL) from 50 mg to 25 mg once daily in AM  2. No change in other heart medicines  3. No need for further testing at this time  4. Follow up with Dr. Chula Polo in 1 year      I appreciate the opportunity of cooperating in the care of this individual.    [place] Vazquez, TIFFANIE - CNP, 7/17/2020, 2:34 PM       QUALITY MEASURES  1. Tobacco Cessation Counseling: NA  2. Retake of BP if >140/90:   NA  3. Documentation to PCP/referring for new patient:  Sent to PCP at close of office visit  4. CAD patient on anti-platelet: Yes  5. CAD patient on STATIN therapy:  Yes  6.  Patient with CHF and aFib on anticoagulation:  NA

## 2021-01-05 ENCOUNTER — TELEPHONE (OUTPATIENT)
Dept: CARDIOTHORACIC SURGERY | Age: 66
End: 2021-01-05

## 2021-01-05 NOTE — TELEPHONE ENCOUNTER
Spoke with patient regarding schedule of 2D ECHO with Doppler and Color on 1/26/2021 at 10:00 am with arrival time of 9:30 am at [facility] for assessment of er mitral valve disease s/p reapir.

## 2021-01-25 DIAGNOSIS — Z98.890 S/P MVR (MITRAL VALVE REPAIR): Primary | ICD-10-CM

## 2021-01-26 ENCOUNTER — HOSPITAL ENCOUNTER (OUTPATIENT)
Dept: NON INVASIVE DIAGNOSTICS | Age: 66
Discharge: HOME | End: 2021-01-26
Payer: MEDICARE

## 2021-01-26 DIAGNOSIS — Z98.890 S/P MVR (MITRAL VALVE REPAIR): ICD-10-CM

## 2021-01-26 LAB
LV EF: 58 %
LVEF MODALITY: NORMAL

## 2021-01-26 PROCEDURE — 93306 TTE W/DOPPLER COMPLETE: CPT

## 2021-02-04 ENCOUNTER — TELEPHONE (OUTPATIENT)
Dept: CARDIOTHORACIC SURGERY | Age: 66
End: 2021-02-04

## 2021-02-04 NOTE — TELEPHONE ENCOUNTER
Dr. Myla Shelton reviewed results of ECHO. Valve is functioning well. EF normal. No concerns from a surgical perspective. No need for future surveillance from our standpoint. Called patient and discussed results. All questions answered. No needs or concerns from the patient. Advised to call if she should need anything in the future.

## 2021-04-01 DIAGNOSIS — Z98.890 S/P MVR (MITRAL VALVE REPAIR): ICD-10-CM

## 2021-04-01 DIAGNOSIS — I10 ESSENTIAL HYPERTENSION: ICD-10-CM

## 2021-04-01 RX ORDER — LISINOPRIL 20 MG/1
TABLET ORAL
Qty: 90 TABLET | Refills: 2 | Status: SHIPPED | OUTPATIENT
Start: 2021-04-01 | End: 2022-04-12 | Stop reason: SDUPTHER

## 2021-07-26 ENCOUNTER — VIRTUAL VISIT (OUTPATIENT)
Dept: FAMILY MEDICINE CLINIC | Age: 66
End: 2021-07-26
Payer: MEDICARE

## 2021-07-26 DIAGNOSIS — K21.9 GASTROESOPHAGEAL REFLUX DISEASE WITHOUT ESOPHAGITIS: Primary | ICD-10-CM

## 2021-07-26 PROCEDURE — G8400 PT W/DXA NO RESULTS DOC: HCPCS | Performed by: NURSE PRACTITIONER

## 2021-07-26 PROCEDURE — 4040F PNEUMOC VAC/ADMIN/RCVD: CPT | Performed by: NURSE PRACTITIONER

## 2021-07-26 PROCEDURE — G8427 DOCREV CUR MEDS BY ELIG CLIN: HCPCS | Performed by: NURSE PRACTITIONER

## 2021-07-26 PROCEDURE — 99212 OFFICE O/P EST SF 10 MIN: CPT | Performed by: NURSE PRACTITIONER

## 2021-07-26 PROCEDURE — 1123F ACP DISCUSS/DSCN MKR DOCD: CPT | Performed by: NURSE PRACTITIONER

## 2021-07-26 PROCEDURE — 3017F COLORECTAL CA SCREEN DOC REV: CPT | Performed by: NURSE PRACTITIONER

## 2021-07-26 PROCEDURE — 1090F PRES/ABSN URINE INCON ASSESS: CPT | Performed by: NURSE PRACTITIONER

## 2021-07-26 RX ORDER — ONDANSETRON 4 MG/1
4 TABLET, FILM COATED ORAL 3 TIMES DAILY PRN
Qty: 30 TABLET | Refills: 0 | Status: SHIPPED | OUTPATIENT
Start: 2021-07-26 | End: 2021-07-30

## 2021-07-26 RX ORDER — OMEPRAZOLE 40 MG/1
40 CAPSULE, DELAYED RELEASE ORAL
Qty: 30 CAPSULE | Refills: 5 | Status: SHIPPED | OUTPATIENT
Start: 2021-07-26 | End: 2022-04-12 | Stop reason: ALTCHOICE

## 2021-07-26 SDOH — ECONOMIC STABILITY: TRANSPORTATION INSECURITY
IN THE PAST 12 MONTHS, HAS LACK OF TRANSPORTATION KEPT YOU FROM MEETINGS, WORK, OR FROM GETTING THINGS NEEDED FOR DAILY LIVING?: NO

## 2021-07-26 SDOH — ECONOMIC STABILITY: FOOD INSECURITY: WITHIN THE PAST 12 MONTHS, YOU WORRIED THAT YOUR FOOD WOULD RUN OUT BEFORE YOU GOT MONEY TO BUY MORE.: NEVER TRUE

## 2021-07-26 SDOH — ECONOMIC STABILITY: TRANSPORTATION INSECURITY
IN THE PAST 12 MONTHS, HAS THE LACK OF TRANSPORTATION KEPT YOU FROM MEDICAL APPOINTMENTS OR FROM GETTING MEDICATIONS?: NO

## 2021-07-26 SDOH — ECONOMIC STABILITY: INCOME INSECURITY: IN THE LAST 12 MONTHS, WAS THERE A TIME WHEN YOU WERE NOT ABLE TO PAY THE MORTGAGE OR RENT ON TIME?: NO

## 2021-07-26 SDOH — ECONOMIC STABILITY: HOUSING INSECURITY
IN THE LAST 12 MONTHS, WAS THERE A TIME WHEN YOU DID NOT HAVE A STEADY PLACE TO SLEEP OR SLEPT IN A SHELTER (INCLUDING NOW)?: NO

## 2021-07-26 SDOH — ECONOMIC STABILITY: FOOD INSECURITY: WITHIN THE PAST 12 MONTHS, THE FOOD YOU BOUGHT JUST DIDN'T LAST AND YOU DIDN'T HAVE MONEY TO GET MORE.: NEVER TRUE

## 2021-07-26 ASSESSMENT — PATIENT HEALTH QUESTIONNAIRE - PHQ9
SUM OF ALL RESPONSES TO PHQ QUESTIONS 1-9: 0
1. LITTLE INTEREST OR PLEASURE IN DOING THINGS: 0
SUM OF ALL RESPONSES TO PHQ QUESTIONS 1-9: 0
SUM OF ALL RESPONSES TO PHQ QUESTIONS 1-9: 0
SUM OF ALL RESPONSES TO PHQ9 QUESTIONS 1 & 2: 0
2. FEELING DOWN, DEPRESSED OR HOPELESS: 0

## 2021-07-26 ASSESSMENT — SOCIAL DETERMINANTS OF HEALTH (SDOH): HOW HARD IS IT FOR YOU TO PAY FOR THE VERY BASICS LIKE FOOD, HOUSING, MEDICAL CARE, AND HEATING?: NOT HARD AT ALL

## 2021-07-30 ENCOUNTER — HOSPITAL ENCOUNTER (OUTPATIENT)
Dept: GENERAL RADIOLOGY | Age: 66
Discharge: HOME | End: 2021-07-30
Payer: MEDICARE

## 2021-07-30 ENCOUNTER — OFFICE VISIT (OUTPATIENT)
Dept: FAMILY MEDICINE CLINIC | Age: 66
End: 2021-07-30
Payer: MEDICARE

## 2021-07-30 ENCOUNTER — TELEPHONE (OUTPATIENT)
Dept: FAMILY MEDICINE CLINIC | Age: 66
End: 2021-07-30

## 2021-07-30 VITALS
SYSTOLIC BLOOD PRESSURE: 132 MMHG | BODY MASS INDEX: 27.98 KG/M2 | OXYGEN SATURATION: 99 % | DIASTOLIC BLOOD PRESSURE: 88 MMHG | HEART RATE: 107 BPM | TEMPERATURE: 98.9 F | WEIGHT: 153 LBS

## 2021-07-30 DIAGNOSIS — R11.0 NAUSEA: ICD-10-CM

## 2021-07-30 DIAGNOSIS — R05.9 COUGH: ICD-10-CM

## 2021-07-30 DIAGNOSIS — N39.0 URINARY TRACT INFECTION WITHOUT HEMATURIA, SITE UNSPECIFIED: Primary | ICD-10-CM

## 2021-07-30 DIAGNOSIS — R53.1 WEAKNESS: ICD-10-CM

## 2021-07-30 LAB
ANION GAP SERPL CALCULATED.3IONS-SCNC: 16 MMOL/L (ref 3–16)
BILIRUBIN, POC: NORMAL
BLOOD URINE, POC: NORMAL
BUN BLDV-MCNC: 36 MG/DL (ref 7–20)
CALCIUM SERPL-MCNC: 8.8 MG/DL (ref 8.3–10.6)
CHLORIDE BLD-SCNC: 88 MMOL/L (ref 99–110)
CLARITY, POC: NORMAL
CO2: 25 MMOL/L (ref 21–32)
COLOR, POC: NORMAL
CREAT SERPL-MCNC: 1.2 MG/DL (ref 0.6–1.2)
GFR AFRICAN AMERICAN: 54
GFR NON-AFRICAN AMERICAN: 45
GLUCOSE BLD-MCNC: 154 MG/DL (ref 70–99)
GLUCOSE URINE, POC: NORMAL
HCT VFR BLD CALC: 43.8 % (ref 36–48)
HEMOGLOBIN: 15.1 G/DL (ref 12–16)
KETONES, POC: NORMAL
LEUKOCYTE EST, POC: NORMAL
MCH RBC QN AUTO: 30.7 PG (ref 26–34)
MCHC RBC AUTO-ENTMCNC: 34.4 G/DL (ref 31–36)
MCV RBC AUTO: 89.3 FL (ref 80–100)
NITRITE, POC: NORMAL
PDW BLD-RTO: 15.6 % (ref 12.4–15.4)
PH, POC: 5.5
PLATELET # BLD: 212 K/UL (ref 135–450)
PMV BLD AUTO: 10.9 FL (ref 5–10.5)
POTASSIUM SERPL-SCNC: 4.5 MMOL/L (ref 3.5–5.1)
PROTEIN, POC: 30
RBC # BLD: 4.91 M/UL (ref 4–5.2)
SODIUM BLD-SCNC: 129 MMOL/L (ref 136–145)
SPECIFIC GRAVITY, POC: 1.02
UROBILINOGEN, POC: 4
WBC # BLD: 8.1 K/UL (ref 4–11)

## 2021-07-30 PROCEDURE — 1090F PRES/ABSN URINE INCON ASSESS: CPT | Performed by: NURSE PRACTITIONER

## 2021-07-30 PROCEDURE — 81002 URINALYSIS NONAUTO W/O SCOPE: CPT | Performed by: NURSE PRACTITIONER

## 2021-07-30 PROCEDURE — 99213 OFFICE O/P EST LOW 20 MIN: CPT | Performed by: NURSE PRACTITIONER

## 2021-07-30 PROCEDURE — 1036F TOBACCO NON-USER: CPT | Performed by: NURSE PRACTITIONER

## 2021-07-30 PROCEDURE — G8427 DOCREV CUR MEDS BY ELIG CLIN: HCPCS | Performed by: NURSE PRACTITIONER

## 2021-07-30 PROCEDURE — 4040F PNEUMOC VAC/ADMIN/RCVD: CPT | Performed by: NURSE PRACTITIONER

## 2021-07-30 PROCEDURE — 3017F COLORECTAL CA SCREEN DOC REV: CPT | Performed by: NURSE PRACTITIONER

## 2021-07-30 PROCEDURE — 36415 COLL VENOUS BLD VENIPUNCTURE: CPT | Performed by: NURSE PRACTITIONER

## 2021-07-30 PROCEDURE — G8417 CALC BMI ABV UP PARAM F/U: HCPCS | Performed by: NURSE PRACTITIONER

## 2021-07-30 PROCEDURE — 1123F ACP DISCUSS/DSCN MKR DOCD: CPT | Performed by: NURSE PRACTITIONER

## 2021-07-30 PROCEDURE — 71046 X-RAY EXAM CHEST 2 VIEWS: CPT

## 2021-07-30 PROCEDURE — G8400 PT W/DXA NO RESULTS DOC: HCPCS | Performed by: NURSE PRACTITIONER

## 2021-07-30 RX ORDER — NITROFURANTOIN 25; 75 MG/1; MG/1
100 CAPSULE ORAL 2 TIMES DAILY
Qty: 10 CAPSULE | Refills: 0 | Status: SHIPPED | OUTPATIENT
Start: 2021-07-30 | End: 2021-08-04

## 2021-07-30 RX ORDER — PROMETHAZINE HYDROCHLORIDE 25 MG/1
25 TABLET ORAL EVERY 8 HOURS PRN
Qty: 21 TABLET | Refills: 0 | Status: SHIPPED | OUTPATIENT
Start: 2021-07-30 | End: 2021-08-06

## 2021-07-30 ASSESSMENT — PATIENT HEALTH QUESTIONNAIRE - PHQ9
2. FEELING DOWN, DEPRESSED OR HOPELESS: 0
1. LITTLE INTEREST OR PLEASURE IN DOING THINGS: 0
SUM OF ALL RESPONSES TO PHQ9 QUESTIONS 1 & 2: 0
SUM OF ALL RESPONSES TO PHQ QUESTIONS 1-9: 0

## 2021-07-30 ASSESSMENT — ENCOUNTER SYMPTOMS
CONSTIPATION: 0
DIARRHEA: 1
VOMITING: 0
COUGH: 1
SHORTNESS OF BREATH: 0
NAUSEA: 1

## 2021-07-30 NOTE — TELEPHONE ENCOUNTER
Please call patient. Let her know that her urinalysis that we ran today does look like she potentially has a urinary tract infection so I went ahead and sent in a prescription for Macrobid which is an antibiotic to her pharmacy. There was not enough urine to send urine culture so she is not feeling any better Monday please call the office.

## 2021-07-30 NOTE — PROGRESS NOTES
2021     Chief Complaint   Patient presents with    Nausea     Pt states that she isn't really eating and feeling weak, productive cough     Nannette Cabrera (:  1955) is a 78 y.o. female, here for evaluation of the following medical concerns:    HPI  Patient here with complaints of cough, diarrhea, nausea, fatigue and weakness. Symptoms started about 2 weeks ago. She was seen virtually by Tamika Jewell on 2021. She was prescribed omeprazole and Zofran. Heartburn symptoms have improved however nausea is still quite persistent. She is having trouble eating. She is able to drink fluids. Today has had a Gatorade and chocolate Ensure. No abdominal pain. Diarrhea resolved. No fever or vomiting. Cough is productive of the sputum is green in color. She denies any shortness of breath or chest pain. Her son was sick with similar symptoms few days prior to her symptoms starting. Has not been vaccinated for Covid and has not recently been tested for Covid. Review of Systems   Constitutional: Negative for chills, fatigue and fever. Respiratory: Positive for cough. Negative for shortness of breath. Cardiovascular: Negative for chest pain and leg swelling. Gastrointestinal: Positive for diarrhea and nausea. Negative for constipation and vomiting. Neurological: Positive for weakness. Negative for dizziness and headaches. All other systems reviewed and are negative. Prior to Visit Medications    Medication Sig Taking?  Authorizing Provider   promethazine (PHENERGAN) 25 MG tablet Take 1 tablet by mouth every 8 hours as needed for Nausea Yes TIFFANIE Franco CNP   nitrofurantoin, macrocrystal-monohydrate, (MACROBID) 100 MG capsule Take 1 capsule by mouth 2 times daily for 5 days Yes TIFFANIE Franco CNP   omeprazole (PRILOSEC) 40 MG delayed release capsule Take 1 capsule by mouth every morning (before breakfast) Yes TIFFANIE Rousseau CNP lisinopril (PRINIVIL;ZESTRIL) 20 MG tablet TAKE ONE TABLET BY MOUTH DAILY Yes Edie Mcmillan DO   atorvastatin (LIPITOR) 20 MG tablet Take 1 tablet by mouth daily Yes TIFFANIE Pisano CNP   metoprolol succinate (TOPROL XL) 25 MG extended release tablet Take 1 tablet by mouth daily Yes TIFFANIE Pisano CNP   amLODIPine (NORVASC) 10 MG tablet Take 1 tablet by mouth daily Yes TIFFANIE Pisano CNP   lisinopril (PRINIVIL;ZESTRIL) 20 MG tablet Take 1 tablet by mouth daily Yes TIFFANIE Pisano CNP   aspirin 81 MG EC tablet Take 1 tablet by mouth daily Yes TIFFANIE Flores CNP        Social History     Tobacco Use    Smoking status: Former Smoker     Packs/day: 0.50     Years: 9.00     Pack years: 4.50     Types: Cigarettes     Start date: 1970     Quit date: 1979     Years since quittin.1    Smokeless tobacco: Never Used   Substance Use Topics    Alcohol use: Yes     Alcohol/week: 15.0 standard drinks     Types: 15 Cans of beer per week     Comment: 15/day- has not had any alcohol since 16. Vitals:    21 1540   BP: 132/88   Site: Left Upper Arm   Position: Sitting   Cuff Size: Medium Adult   Pulse: 107   Temp: 98.9 °F (37.2 °C)   TempSrc: Infrared   SpO2: 99%   Weight: 153 lb (69.4 kg)     Estimated body mass index is 27.98 kg/m² as calculated from the following:    Height as of 20: 5' 2\" (1.575 m). Weight as of this encounter: 153 lb (69.4 kg). Physical Exam  Vitals and nursing note reviewed. Constitutional:       General: She is not in acute distress. Appearance: Normal appearance. She is well-developed. She is not ill-appearing, toxic-appearing or diaphoretic. HENT:      Head: Normocephalic and atraumatic. Right Ear: Tympanic membrane, ear canal and external ear normal. There is no impacted cerumen. Left Ear: Tympanic membrane, ear canal and external ear normal. There is no impacted cerumen.       Nose: No congestion or rhinorrhea. Mouth/Throat:      Mouth: Mucous membranes are moist.      Pharynx: Oropharynx is clear. No oropharyngeal exudate or posterior oropharyngeal erythema. Eyes:      General: No scleral icterus. Right eye: No discharge. Left eye: No discharge. Extraocular Movements: Extraocular movements intact. Conjunctiva/sclera: Conjunctivae normal.      Pupils: Pupils are equal, round, and reactive to light. Cardiovascular:      Rate and Rhythm: Normal rate and regular rhythm. Heart sounds: Normal heart sounds, S1 normal and S2 normal. No murmur heard. No friction rub. No gallop. Pulmonary:      Effort: Pulmonary effort is normal. No respiratory distress. Breath sounds: Normal breath sounds. No stridor. No wheezing, rhonchi or rales. Abdominal:      General: Abdomen is flat. Bowel sounds are normal. There is no distension. Palpations: Abdomen is soft. There is no mass. Tenderness: There is no abdominal tenderness. There is no guarding. Hernia: No hernia is present. Neurological:      General: No focal deficit present. Mental Status: She is alert and oriented to person, place, and time. Mental status is at baseline. Cranial Nerves: No cranial nerve deficit. Psychiatric:         Speech: Speech normal.         ASSESSMENT/PLAN:  1. Urinary tract infection without hematuria, site unspecified  - POCT Urinalysis no Micro  - nitrofurantoin, macrocrystal-monohydrate, (MACROBID) 100 MG capsule; Take 1 capsule by mouth 2 times daily for 5 days  Dispense: 10 capsule; Refill: 0    2. Nausea  - promethazine (PHENERGAN) 25 MG tablet; Take 1 tablet by mouth every 8 hours as needed for Nausea  Dispense: 21 tablet; Refill: 0  - POCT Urinalysis no Micro    3. Weakness  - POCT Urinalysis no Micro    4. Cough  - BASIC METABOLIC PANEL  - CBC  - XR CHEST (2 VW); Future    We will start Macrobid urinalysis today with large leukocytes.   There was not enough urine to send a culture. We will also obtain chest x-ray and BMP and CBC today. Provide her with a prescription for promethazine 25 mg every 8 hours as needed for nausea. Cautioned on sedating side effects. Additionally recommended she follow the brat diet and advance as tolerated. Hydrate well. Recommend COVID test as well  Follow-up Monday in office if symptoms do not improve or to the urgent care/ED this weekend for worsening of symptoms. Patient agreeable. An electronic signature was used to authenticate this note.     --TIFFANIE Murray - CNP on 7/30/2021 at 4:44 PM

## 2021-07-30 NOTE — PATIENT INSTRUCTIONS
· Blood work  · Chest x-ray  · Phenergan as needed for nausea- may make you tired  · Call [phone] to schedule COVID test

## 2021-07-31 DIAGNOSIS — J18.9 MULTIFOCAL PNEUMONIA: Primary | ICD-10-CM

## 2021-07-31 RX ORDER — AZITHROMYCIN 250 MG/1
TABLET, FILM COATED ORAL
Qty: 6 TABLET | Refills: 0 | Status: SHIPPED | OUTPATIENT
Start: 2021-07-31 | End: 2021-08-20

## 2021-08-04 ASSESSMENT — ENCOUNTER SYMPTOMS
BLOOD IN STOOL: 0
RESPIRATORY NEGATIVE: 1
ABDOMINAL PAIN: 1
NAUSEA: 1

## 2021-08-04 NOTE — PROGRESS NOTES
Subjective:      Chief Complaint   Patient presents with    Nausea     x 1 week     Gastroesophageal Reflux       Patient ID: Delores Curry is a 78 y.o. female who presents for symptoms of gastric reflux. Also has nausea for about 1 week. She cannot relate any causative factor but states she is just miserable. If she lies flat she can almost feel the burning began to start coming up into her chest.    HPI see above    Family History   Problem Relation Age of Onset    High Blood Pressure Mother     Diabetes Mother        Social History     Socioeconomic History    Marital status:      Spouse name: Not on file    Number of children: Not on file    Years of education: Not on file    Highest education level: Not on file   Occupational History    Not on file   Tobacco Use    Smoking status: Former Smoker     Packs/day: 0.50     Years: 9.00     Pack years: 4.50     Types: Cigarettes     Start date: 1970     Quit date: 1979     Years since quittin.2    Smokeless tobacco: Never Used   Vaping Use    Vaping Use: Never used   Substance and Sexual Activity    Alcohol use: Yes     Alcohol/week: 15.0 standard drinks     Types: 15 Cans of beer per week     Comment: 15/day- has not had any alcohol since 16.  Drug use: No    Sexual activity: Yes   Other Topics Concern    Not on file   Social History Narrative    Not on file     Social Determinants of Health     Financial Resource Strain: Low Risk     Difficulty of Paying Living Expenses: Not hard at all   Food Insecurity: No Food Insecurity    Worried About Running Out of Food in the Last Year: Never true    Mireille of Food in the Last Year: Never true   Transportation Needs: No Transportation Needs    Lack of Transportation (Medical): No    Lack of Transportation (Non-Medical):  No   Physical Activity:     Days of Exercise per Week:     Minutes of Exercise per Session:    Stress:     Feeling of Stress :    Social Connections:  Frequency of Communication with Friends and Family:     Frequency of Social Gatherings with Friends and Family:     Attends Religion Services:     Active Member of Clubs or Organizations:     Attends Club or Organization Meetings:     Marital Status:    Intimate Partner Violence:     Fear of Current or Ex-Partner:     Emotionally Abused:     Physically Abused:     Sexually Abused:        Current Outpatient Medications on File Prior to Visit   Medication Sig Dispense Refill    lisinopril (PRINIVIL;ZESTRIL) 20 MG tablet TAKE ONE TABLET BY MOUTH DAILY 90 tablet 2    atorvastatin (LIPITOR) 20 MG tablet Take 1 tablet by mouth daily 90 tablet 3    metoprolol succinate (TOPROL XL) 25 MG extended release tablet Take 1 tablet by mouth daily 90 tablet 3    amLODIPine (NORVASC) 10 MG tablet Take 1 tablet by mouth daily 90 tablet 3    aspirin 81 MG EC tablet Take 1 tablet by mouth daily 30 tablet 3    [DISCONTINUED] lisinopril (PRINIVIL;ZESTRIL) 20 MG tablet Take 1 tablet by mouth daily 90 tablet 3     No current facility-administered medications on file prior to visit. Review of Systems   Respiratory: Negative. Cardiovascular: Negative. Hypertension past vitals show that is controlled on Norvasc, lisinopril and Toprol  Hyperlipidemia controlled with atorvastatin   Gastrointestinal: Positive for abdominal pain and nausea. Negative for blood in stool. GERDPPI   Psychiatric/Behavioral: The patient is nervous/anxious. History of anxiety and alcohol abuse       Objective:     Physical Exam  Constitutional:       Appearance: Normal appearance. HENT:      Head: Normocephalic and atraumatic. Eyes:      Conjunctiva/sclera: Conjunctivae normal.   Musculoskeletal:         General: Normal range of motion. Skin:     General: Skin is warm and dry. Neurological:      Mental Status: She is alert and oriented to person, place, and time.    Psychiatric:         Mood and Affect: Mood normal.         Behavior: Behavior normal.         Thought Content:  Thought content normal.         Judgment: Judgment normal.         Assessment:       Plan:

## 2021-08-20 ENCOUNTER — HOSPITAL ENCOUNTER (OUTPATIENT)
Dept: GENERAL RADIOLOGY | Age: 66
Discharge: HOME | End: 2021-08-20
Payer: MEDICARE

## 2021-08-20 ENCOUNTER — OFFICE VISIT (OUTPATIENT)
Dept: FAMILY MEDICINE CLINIC | Age: 66
End: 2021-08-20
Payer: MEDICARE

## 2021-08-20 VITALS
TEMPERATURE: 97.8 F | OXYGEN SATURATION: 99 % | BODY MASS INDEX: 29.08 KG/M2 | WEIGHT: 158 LBS | SYSTOLIC BLOOD PRESSURE: 156 MMHG | DIASTOLIC BLOOD PRESSURE: 98 MMHG | HEART RATE: 114 BPM | HEIGHT: 62 IN

## 2021-08-20 DIAGNOSIS — J18.9 MULTIFOCAL PNEUMONIA: Primary | ICD-10-CM

## 2021-08-20 DIAGNOSIS — I10 ESSENTIAL HYPERTENSION: ICD-10-CM

## 2021-08-20 DIAGNOSIS — J18.9 MULTIFOCAL PNEUMONIA: ICD-10-CM

## 2021-08-20 PROCEDURE — 1090F PRES/ABSN URINE INCON ASSESS: CPT | Performed by: NURSE PRACTITIONER

## 2021-08-20 PROCEDURE — 99213 OFFICE O/P EST LOW 20 MIN: CPT | Performed by: NURSE PRACTITIONER

## 2021-08-20 PROCEDURE — 1123F ACP DISCUSS/DSCN MKR DOCD: CPT | Performed by: NURSE PRACTITIONER

## 2021-08-20 PROCEDURE — 4040F PNEUMOC VAC/ADMIN/RCVD: CPT | Performed by: NURSE PRACTITIONER

## 2021-08-20 PROCEDURE — G8427 DOCREV CUR MEDS BY ELIG CLIN: HCPCS | Performed by: NURSE PRACTITIONER

## 2021-08-20 PROCEDURE — 71046 X-RAY EXAM CHEST 2 VIEWS: CPT

## 2021-08-20 PROCEDURE — 1036F TOBACCO NON-USER: CPT | Performed by: NURSE PRACTITIONER

## 2021-08-20 PROCEDURE — G8400 PT W/DXA NO RESULTS DOC: HCPCS | Performed by: NURSE PRACTITIONER

## 2021-08-20 PROCEDURE — G8417 CALC BMI ABV UP PARAM F/U: HCPCS | Performed by: NURSE PRACTITIONER

## 2021-08-20 PROCEDURE — 3017F COLORECTAL CA SCREEN DOC REV: CPT | Performed by: NURSE PRACTITIONER

## 2021-08-20 ASSESSMENT — ENCOUNTER SYMPTOMS
DIARRHEA: 0
NAUSEA: 0
SHORTNESS OF BREATH: 0
VOMITING: 0
COUGH: 0

## 2021-08-20 NOTE — PROGRESS NOTES
2021     Chief Complaint   Patient presents with    Pneumonia     follow up     Joan Soriano (:  1955) is a 78 y.o. female, here for evaluation of the following medical concerns:    HPI    Patient here today for follow-up on pneumonia. She reports she is feeling well today. Cough, fever, nausea have resolved. She was treated with azithromycin and finished that course of medication without complication. Her blood pressure is elevated today, this is typically well controlled. Reports that she had a rush to get here and thinks that is why her blood pressure is elevated. Previous HPI 2021  Patient here with complaints of cough, diarrhea, nausea, fatigue and weakness. Symptoms started about 2 weeks ago. She was seen virtually by Janice Ruth on 2021. She was prescribed omeprazole and Zofran. Heartburn symptoms have improved however nausea is still quite persistent. She is having trouble eating. She is able to drink fluids. Today has had a Gatorade and chocolate Ensure. No abdominal pain. Diarrhea resolved. No fever or vomiting. Cough is productive of the sputum is green in color. She denies any shortness of breath or chest pain. Her son was sick with similar symptoms few days prior to her symptoms starting. Has not been vaccinated for Covid and has not recently been tested for Covid. Review of Systems   Constitutional: Negative for chills, fatigue and fever. Respiratory: Negative for cough and shortness of breath. Cardiovascular: Negative for chest pain and leg swelling. Gastrointestinal: Negative for diarrhea, nausea and vomiting. Neurological: Negative for dizziness and headaches. All other systems reviewed and are negative. Prior to Visit Medications    Medication Sig Taking?  Authorizing Provider   omeprazole (PRILOSEC) 40 MG delayed release capsule Take 1 capsule by mouth every morning (before breakfast) Yes Guillermina Tamayo, APRN - CNP   lisinopril (PRINIVIL;ZESTRIL) 20 MG tablet TAKE ONE TABLET BY MOUTH DAILY Yes Monalisa Blankenship, DO   atorvastatin (LIPITOR) 20 MG tablet Take 1 tablet by mouth daily Yes Sirena Moment, APRN - CNP   metoprolol succinate (TOPROL XL) 25 MG extended release tablet Take 1 tablet by mouth daily Yes Sirena Moment, APRN - CNP   amLODIPine (NORVASC) 10 MG tablet Take 1 tablet by mouth daily Yes Sirena Moment, APRN - CNP   aspirin 81 MG EC tablet Take 1 tablet by mouth daily Yes Lorena Cline, APRN - CNP   lisinopril (PRINIVIL;ZESTRIL) 20 MG tablet Take 1 tablet by mouth daily  Sirena Moment, APRN - CNP        Social History     Tobacco Use    Smoking status: Former Smoker     Packs/day: 0.50     Years: 9.00     Pack years: 4.50     Types: Cigarettes     Start date: 1970     Quit date: 1979     Years since quittin.2    Smokeless tobacco: Never Used   Substance Use Topics    Alcohol use: Yes     Alcohol/week: 15.0 standard drinks     Types: 15 Cans of beer per week     Comment: 15/day- has not had any alcohol since 17. Vitals:    21 1537 21 1543   BP: (!) 168/106 (!) 156/98   Site: Left Upper Arm Left Upper Arm   Position: Sitting Sitting   Cuff Size: Medium Adult Large Adult   Pulse: 114    Temp: 97.8 °F (36.6 °C)    SpO2: 99%    Weight: 158 lb (71.7 kg)    Height: 5' 2\" (1.575 m)      Estimated body mass index is 28.9 kg/m² as calculated from the following:    Height as of this encounter: 5' 2\" (1.575 m). Weight as of this encounter: 158 lb (71.7 kg). Physical Exam  Vitals and nursing note reviewed. Constitutional:       General: She is not in acute distress. Appearance: Normal appearance. She is well-developed. She is not ill-appearing, toxic-appearing or diaphoretic. HENT:      Head: Normocephalic and atraumatic. Eyes:      Extraocular Movements: Extraocular movements intact. Pupils: Pupils are equal, round, and reactive to light. Cardiovascular:      Rate and Rhythm: Normal rate and regular rhythm. Heart sounds: Normal heart sounds, S1 normal and S2 normal. No murmur heard. No friction rub. No gallop. Pulmonary:      Effort: Pulmonary effort is normal. No respiratory distress. Breath sounds: Normal breath sounds. No stridor. No wheezing, rhonchi or rales. Comments: Clear throughout  Neurological:      General: No focal deficit present. Mental Status: She is alert and oriented to person, place, and time. Mental status is at baseline. Cranial Nerves: No cranial nerve deficit. Psychiatric:         Speech: Speech normal.       ASSESSMENT/PLAN:  1. Multifocal pneumonia-symptoms resolved. Will follow-up on chest x-ray today. - XR CHEST (2 VW); Future    2. Essential hypertension-elevated today but typically well controlled. Had a rush to get to appointment on time. Asked that she monitor at home and call if remains elevated. Recommended Pneumovax- declines today wants to think about it. An electronic signature was used to authenticate this note.     --Nicanor Pandey, APRN - CNP on 8/20/2021 at 4:38 PM

## 2021-11-22 RX ORDER — METOPROLOL SUCCINATE 25 MG/1
TABLET, EXTENDED RELEASE ORAL
Qty: 30 TABLET | Refills: 0 | Status: SHIPPED | OUTPATIENT
Start: 2021-11-22 | End: 2022-01-20

## 2022-01-04 ENCOUNTER — OFFICE VISIT (OUTPATIENT)
Dept: CARDIOLOGY CLINIC | Age: 67
End: 2022-01-04
Payer: MEDICARE

## 2022-01-04 VITALS
SYSTOLIC BLOOD PRESSURE: 150 MMHG | BODY MASS INDEX: 30.27 KG/M2 | HEART RATE: 93 BPM | HEIGHT: 62 IN | OXYGEN SATURATION: 97 % | WEIGHT: 164.5 LBS | DIASTOLIC BLOOD PRESSURE: 110 MMHG | TEMPERATURE: 97.9 F

## 2022-01-04 DIAGNOSIS — Z87.891 HISTORY OF TOBACCO ABUSE: ICD-10-CM

## 2022-01-04 DIAGNOSIS — Z98.890 S/P MVR (MITRAL VALVE REPAIR): ICD-10-CM

## 2022-01-04 DIAGNOSIS — I10 ESSENTIAL HYPERTENSION: Primary | ICD-10-CM

## 2022-01-04 DIAGNOSIS — E78.2 MIXED HYPERLIPIDEMIA: ICD-10-CM

## 2022-01-04 PROCEDURE — 1090F PRES/ABSN URINE INCON ASSESS: CPT | Performed by: INTERNAL MEDICINE

## 2022-01-04 PROCEDURE — 1123F ACP DISCUSS/DSCN MKR DOCD: CPT | Performed by: INTERNAL MEDICINE

## 2022-01-04 PROCEDURE — 3017F COLORECTAL CA SCREEN DOC REV: CPT | Performed by: INTERNAL MEDICINE

## 2022-01-04 PROCEDURE — G8400 PT W/DXA NO RESULTS DOC: HCPCS | Performed by: INTERNAL MEDICINE

## 2022-01-04 PROCEDURE — G8427 DOCREV CUR MEDS BY ELIG CLIN: HCPCS | Performed by: INTERNAL MEDICINE

## 2022-01-04 PROCEDURE — G8417 CALC BMI ABV UP PARAM F/U: HCPCS | Performed by: INTERNAL MEDICINE

## 2022-01-04 PROCEDURE — 1036F TOBACCO NON-USER: CPT | Performed by: INTERNAL MEDICINE

## 2022-01-04 PROCEDURE — 4040F PNEUMOC VAC/ADMIN/RCVD: CPT | Performed by: INTERNAL MEDICINE

## 2022-01-04 PROCEDURE — G8484 FLU IMMUNIZE NO ADMIN: HCPCS | Performed by: INTERNAL MEDICINE

## 2022-01-04 PROCEDURE — 99214 OFFICE O/P EST MOD 30 MIN: CPT | Performed by: INTERNAL MEDICINE

## 2022-01-04 NOTE — PATIENT INSTRUCTIONS
Plan:  1. Continue current medication regimen as prescribed. Take all medications in the morning, to help better control blood pressure  2. Bring your blood pressure cuff in to evaluate accuracy. Keep a record of blood pressures. Aim for 140/90 consistently. If you are averaging above this number, please call the office, and we can adjust medications. 3. Blood work in 2 months, CMP, CBC, and lipids  4. Please make an appointment for routine dental care, to protect your valve. 5. Follow up with Pepe Garibay NP, in 3 months  6. Follow up with me in 6 months    Scribe's attestation:   This note was scribed in the presence of Dr. Jie Kaufman M.D. By Jennifer Ruiz RN

## 2022-01-04 NOTE — PROGRESS NOTES
[facility]   Cardiac Consultation    Referring Provider:  Alvaro Henriquez DO     Chief Complaint   Patient presents with    1 Year Follow Up    New Patient    Other     No new complaints      Subjective: Ms. Mignon Baker presents for cardiology follow up for MR s/p MV ring repair, HTN, and HLD; no complaints today; new to me today but prior saw Dr. Peggy Mason and NP Hima Perez. History of Present Illness:  Ms. Angela Muñiz is a 78 y.o. female with PMH severe MR and flail posterior leaflet s/p MV ring repair 2/5/2018 by Dr. Sandoval Cope, HTN, and HLD. Most recent [address] 1/12/2018 by Dr Peggy Mason showed no evidence of CAD. Most recent ECHO 1/26/2021 EF of 55-60%. Abnormal (paradoxical) septal wall motion; LV normal in size with mild posterior hypertrophy. Sigmoid septum is present. Moderate-severe LAE; Rv systolic function is reduced. An annuloplasty ring is seen in the mitral position. Trace MR. The posterior MV leaflet is restricted in movement. Today she is here for continued blood pressure management. Current medication regimen reviewed. She reports having trouble remembering to take her amlodipine and lipitor (normally takes nighttime). Encouraged patient to take all medications at once in the morning to increase compliance. She verified understanding. She feels good today and denies SOB/REYNOLDS, palpitations, dizziness, near-syncope or antonio syncope. Denies PND, orthopnea, bendopnea, increasing lower extremity edema or weight gain. The patient works part time at the [employer]. She reports not seeing dentist since MV repair and has tooth pain. Told to see dentist asap and make sure abx taken prior to any procedure or cleaning. Weight zjqcm=441#. Past Medical History:   has a past medical history of Anxiety, ETOH abuse, Hyperlipidemia, and Hypertension.     Surgical History:   has a past surgical history that includes Bunionectomy; Mitral valvuloplasty (02/05/2018); transesophageal echocardiogram (02/05/2018); and Cardiac catheterization (01/15/2018). Social History:   reports that she quit smoking about 42 years ago. Her smoking use included cigarettes. She started smoking about 51 years ago. She has a 4.50 pack-year smoking history. She has never used smokeless tobacco. She reports current alcohol use of about 15.0 standard drinks of alcohol per week. She reports that she does not use drugs. Family History:  family history includes Diabetes in her mother; High Blood Pressure in her mother. Home Medications:  Prior to Admission medications    Medication Sig Start Date End Date Taking? Authorizing Provider   metoprolol succinate (TOPROL XL) 25 MG extended release tablet TAKE ONE TABLET BY MOUTH DAILY 11/22/21  Yes TIFFANIE Tariq CNP   omeprazole (PRILOSEC) 40 MG delayed release capsule Take 1 capsule by mouth every morning (before breakfast)  Patient taking differently: Take 40 mg by mouth as needed  7/26/21  Yes TIFFANIE Jaime CNP   lisinopril (PRINIVIL;ZESTRIL) 20 MG tablet TAKE ONE TABLET BY MOUTH DAILY 4/1/21  Yes Phil Guidry DO   atorvastatin (LIPITOR) 20 MG tablet Take 1 tablet by mouth daily 7/17/20  Yes TIFFANIE Tariq CNP   amLODIPine (NORVASC) 10 MG tablet Take 1 tablet by mouth daily 7/17/20  Yes TIFFANIE Tariq CNP   aspirin 81 MG EC tablet Take 1 tablet by mouth daily 2/10/18  Yes TIFFANIE Adams CNP   lisinopril (PRINIVIL;ZESTRIL) 20 MG tablet Take 1 tablet by mouth daily 7/17/20   TIFFANIE Tariq CNP        Allergies:  Hctz [hydrochlorothiazide]     Review of Systems:   · Constitutional: there has been no unanticipated weight loss. There's been no change in energy level, sleep pattern, or activity level. · Eyes: No visual changes or diplopia. No scleral icterus. · ENT: No Headaches, hearing loss or vertigo. No mouth sores or sore throat.   · Cardiovascular: Reviewed in HPI  · Respiratory: No cough or wheezing, no sputum production. No hematemesis. · Gastrointestinal: No abdominal pain, appetite loss, blood in stools. No change in bowel or bladder habits. · Genitourinary: No dysuria, trouble voiding, or hematuria. · Musculoskeletal:  No gait disturbance, weakness or joint complaints. · Integumentary: No rash or pruritis. · Neurological: No headache, diplopia, change in muscle strength, numbness or tingling. No change in gait, balance, coordination, mood, affect, memory, mentation, behavior. · Psychiatric: No anxiety, no depression. · Endocrine: No malaise, fatigue or temperature intolerance. No excessive thirst, fluid intake, or urination. No tremor. · Hematologic/Lymphatic: No abnormal bruising or bleeding, blood clots or swollen lymph nodes. · Allergic/Immunologic: No nasal congestion or hives. Physical Examination:    Vitals:    01/04/22 1406   BP: (!) 150/110   Pulse: 93   Temp: 97.9   SpO2: 93%        Constitutional and General Appearance: NAD   Respiratory:  · Normal excursion and expansion without use of accessory muscles  · Resp Auscultation: Normal breath sounds without dullness  Cardiovascular:  · The apical impulses not displaced  · Heart tones are crisp and normal  · Cervical veins are not engorged  · The carotid upstroke is normal in amplitude and contour without delay or bruit  · Normal S1S2, No S3, No Murmur  · Peripheral pulses are symmetrical and full  · There is no clubbing, cyanosis of the extremities. · No edema  · Femoral Arteries: 2+ and equal  · Pedal Pulses: 2+ and equal   Abdomen:  · No masses or tenderness  · Liver/Spleen: No Abnormalities Noted  Neurological/Psychiatric:  · Alert and oriented in all spheres  · Moves all extremities well  · Exhibits normal gait balance and coordination  · No abnormalities of mood, affect, memory, mentation, or behavior are noted  Skin:  · Skin: warm and dry.     Lab Results   Component Value Date    CHOL 226 (H) 07/15/2020    CHOL 276 (H) 04/09/2019 protect your valve. 5. Follow up with Ivana Farah NP, in 3 months  6. Follow up with me in 6 months  7. See dentist asap for tooth pain and no routine care since surgery. Warned to take abx before any procedure or tooth cleaning and her risk of endocarditis. Scribe's attestation: This note was scribed in the presence of Dr. Aleksey Farias M.D. By Loni Hendrickson, Dr. Aleksey Farias, personally performed the services described in this documentation, as scribed by the above signed scribe in my presence. It is both accurate and complete to my knowledge. I agree with the details independently gathered by the clinical support staff, while the remaining scribed note accurately describes my personal service to the patient. Cost of prescription medications and patient compliance have been reviewed with patient. All questions answered. Thank you for allowing me to participate in the care of this individual.    Sara Guerrier.  Melissa Wilson M.D., [facility] [place]

## 2022-01-04 NOTE — PROGRESS NOTES
[address]   Cardiac Consultation    Referring Provider:  Bailey Green DO     No chief complaint on file. Subjective: Ms. Carla Marte presents for cardiology follow up for MR s/p MVr, HTN, and HLD. History of Present Illness:  Ms. Mehreen Cole is a 78 y.o. female with a past medical history of MR s/p MVr 2/5/2018, HTN, and HLD. [facility] 1/12/2018 with one of my former partners, Dr Tori Griffith, showed no evidence of CAD. Most recent echo 1/26/2021 showed an EF of 55-60%. Abnormal (paradoxical) septal wall motion consistent with left bundle branch block. The left ventricle is normal in size with mild posterior hypertrophy. Sigmoid septum is present. Indeterminate diastolic function due to mitral valve repair. The left atrium is moderately to severely dilated. Right ventricular systolic function is reduced. An annuloplasty ring is seen in the mitral position. The posterior mitral valve leaflet is restricted in movement. ***    Past Medical History:   has a past medical history of Anxiety, ETOH abuse, Hyperlipidemia, and Hypertension. Surgical History:   has a past surgical history that includes Bunionectomy; Mitral valvuloplasty (02/05/2018); transesophageal echocardiogram (02/05/2018); and Cardiac catheterization (01/15/2018). Social History:   reports that she quit smoking about 42 years ago. Her smoking use included cigarettes. She started smoking about 51 years ago. She has a 4.50 pack-year smoking history. She has never used smokeless tobacco. She reports current alcohol use of about 15.0 standard drinks of alcohol per week. She reports that she does not use drugs. Family History:  family history includes Diabetes in her mother; High Blood Pressure in her mother. Home Medications:  Prior to Admission medications    Medication Sig Start Date End Date Taking?  Authorizing Provider   metoprolol succinate (TOPROL XL) 25 MG extended release tablet TAKE ONE TABLET BY MOUTH DAILY 11/22/21   Geetha DELGADO TIFFANIE Multani - CNP   omeprazole (PRILOSEC) 40 MG delayed release capsule Take 1 capsule by mouth every morning (before breakfast) 7/26/21   TIFFANIE Michelle CNP   lisinopril (PRINIVIL;ZESTRIL) 20 MG tablet TAKE ONE TABLET BY MOUTH DAILY 4/1/21   Matt Ulloa DO   atorvastatin (LIPITOR) 20 MG tablet Take 1 tablet by mouth daily 7/17/20   TIFFANIE Bach - CNP   amLODIPine (NORVASC) 10 MG tablet Take 1 tablet by mouth daily 7/17/20   VeraTIFFANIE Acevedo - CNP   lisinopril (PRINIVIL;ZESTRIL) 20 MG tablet Take 1 tablet by mouth daily 7/17/20   TIFFANIE Bach CNP   aspirin 81 MG EC tablet Take 1 tablet by mouth daily 2/10/18   TIFFANIE Alvarado - CNP        Allergies:  Hctz [hydrochlorothiazide]     Review of Systems:   · Constitutional: there has been no unanticipated weight loss. There's been no change in energy level, sleep pattern, or activity level. · Eyes: No visual changes or diplopia. No scleral icterus. · ENT: No Headaches, hearing loss or vertigo. No mouth sores or sore throat. · Cardiovascular: Reviewed in HPI  · Respiratory: No cough or wheezing, no sputum production. No hematemesis. · Gastrointestinal: No abdominal pain, appetite loss, blood in stools. No change in bowel or bladder habits. · Genitourinary: No dysuria, trouble voiding, or hematuria. · Musculoskeletal:  No gait disturbance, weakness or joint complaints. · Integumentary: No rash or pruritis. · Neurological: No headache, diplopia, change in muscle strength, numbness or tingling. No change in gait, balance, coordination, mood, affect, memory, mentation, behavior. · Psychiatric: No anxiety, no depression. · Endocrine: No malaise, fatigue or temperature intolerance. No excessive thirst, fluid intake, or urination. No tremor. · Hematologic/Lymphatic: No abnormal bruising or bleeding, blood clots or swollen lymph nodes. · Allergic/Immunologic: No nasal congestion or hives.         Physical Examination:    There were no vitals filed for this visit. Constitutional and General Appearance: NAD   Respiratory:  · Normal excursion and expansion without use of accessory muscles  · Resp Auscultation: Normal breath sounds without dullness  Cardiovascular:  · The apical impulses not displaced  · Heart tones are crisp and normal  · Cervical veins are not engorged  · The carotid upstroke is normal in amplitude and contour without delay or bruit  · Normal S1S2, No S3, No Murmur  · Peripheral pulses are symmetrical and full  · There is no clubbing, cyanosis of the extremities. · No edema  · Femoral Arteries: 2+ and equal  · Pedal Pulses: 2+ and equal   Abdomen:  · No masses or tenderness  · Liver/Spleen: No Abnormalities Noted  Neurological/Psychiatric:  · Alert and oriented in all spheres  · Moves all extremities well  · Exhibits normal gait balance and coordination  · No abnormalities of mood, affect, memory, mentation, or behavior are noted  Skin:  · Skin: warm and dry. Assessment:     No diagnosis found. Plan:  1. ***    ***    ***    Cost of prescription medications and patient compliance have been reviewed with patient. All questions answered. Thank you for allowing me to participate in the care of this individual.      Yasmany .  Mary Mccracken M.D., [facility]

## 2022-01-19 DIAGNOSIS — I10 ESSENTIAL HYPERTENSION: Primary | ICD-10-CM

## 2022-01-20 RX ORDER — METOPROLOL SUCCINATE 25 MG/1
TABLET, EXTENDED RELEASE ORAL
Qty: 90 TABLET | Refills: 3 | Status: SHIPPED | OUTPATIENT
Start: 2022-01-20 | End: 2022-08-30 | Stop reason: SDUPTHER

## 2022-03-02 DIAGNOSIS — E78.2 MIXED HYPERLIPIDEMIA: ICD-10-CM

## 2022-03-02 DIAGNOSIS — I10 ESSENTIAL HYPERTENSION: ICD-10-CM

## 2022-03-02 LAB
BASOPHILS ABSOLUTE: 0 K/UL (ref 0–0.2)
BASOPHILS RELATIVE PERCENT: 0.6 %
EOSINOPHILS ABSOLUTE: 0.2 K/UL (ref 0–0.6)
EOSINOPHILS RELATIVE PERCENT: 2.5 %
HCT VFR BLD CALC: 40.6 % (ref 36–48)
HEMOGLOBIN: 13.5 G/DL (ref 12–16)
LYMPHOCYTES ABSOLUTE: 2 K/UL (ref 1–5.1)
LYMPHOCYTES RELATIVE PERCENT: 32.1 %
MCH RBC QN AUTO: 29.7 PG (ref 26–34)
MCHC RBC AUTO-ENTMCNC: 33.3 G/DL (ref 31–36)
MCV RBC AUTO: 89.4 FL (ref 80–100)
MONOCYTES ABSOLUTE: 0.6 K/UL (ref 0–1.3)
MONOCYTES RELATIVE PERCENT: 9.2 %
NEUTROPHILS ABSOLUTE: 3.5 K/UL (ref 1.7–7.7)
NEUTROPHILS RELATIVE PERCENT: 55.6 %
PDW BLD-RTO: 14.5 % (ref 12.4–15.4)
PLATELET # BLD: 210 K/UL (ref 135–450)
PMV BLD AUTO: 9.9 FL (ref 5–10.5)
RBC # BLD: 4.55 M/UL (ref 4–5.2)
WBC # BLD: 6.2 K/UL (ref 4–11)

## 2022-03-03 DIAGNOSIS — Z79.899 MEDICATION MANAGEMENT: Primary | ICD-10-CM

## 2022-03-03 LAB
A/G RATIO: 1.8 (ref 1.1–2.2)
ALBUMIN SERPL-MCNC: 4.6 G/DL (ref 3.4–5)
ALP BLD-CCNC: 95 U/L (ref 40–129)
ALT SERPL-CCNC: 15 U/L (ref 10–40)
ANION GAP SERPL CALCULATED.3IONS-SCNC: 19 MMOL/L (ref 3–16)
AST SERPL-CCNC: 18 U/L (ref 15–37)
BILIRUB SERPL-MCNC: 0.5 MG/DL (ref 0–1)
BUN BLDV-MCNC: 11 MG/DL (ref 7–20)
CALCIUM SERPL-MCNC: 10.1 MG/DL (ref 8.3–10.6)
CHLORIDE BLD-SCNC: 100 MMOL/L (ref 99–110)
CHOLESTEROL, TOTAL: 286 MG/DL (ref 0–199)
CO2: 19 MMOL/L (ref 21–32)
CREAT SERPL-MCNC: 0.9 MG/DL (ref 0.6–1.2)
GFR AFRICAN AMERICAN: >60
GFR NON-AFRICAN AMERICAN: >60
GLUCOSE BLD-MCNC: 91 MG/DL (ref 70–99)
HDLC SERPL-MCNC: 87 MG/DL (ref 40–60)
LDL CHOLESTEROL CALCULATED: 173 MG/DL
POTASSIUM SERPL-SCNC: 4.5 MMOL/L (ref 3.5–5.1)
SODIUM BLD-SCNC: 138 MMOL/L (ref 136–145)
TOTAL PROTEIN: 7.1 G/DL (ref 6.4–8.2)
TRIGL SERPL-MCNC: 130 MG/DL (ref 0–150)
VLDLC SERPL CALC-MCNC: 26 MG/DL

## 2022-03-03 RX ORDER — ATORVASTATIN CALCIUM 40 MG/1
40 TABLET, FILM COATED ORAL DAILY
Qty: 90 TABLET | Refills: 3 | Status: SHIPPED | OUTPATIENT
Start: 2022-03-03 | End: 2022-08-30 | Stop reason: SDUPTHER

## 2022-04-11 ENCOUNTER — TELEPHONE (OUTPATIENT)
Dept: CARDIOLOGY CLINIC | Age: 67
End: 2022-04-11

## 2022-04-11 NOTE — TELEPHONE ENCOUNTER
Pt has an ov on 4/12 with npdd. Pt would like to know if she has to get the lab work done that smyaneth ordered on 3/3/22 prior to that ov. Pt stated that she thought mauricio said May but wanted to make sure.

## 2022-04-12 ENCOUNTER — OFFICE VISIT (OUTPATIENT)
Dept: CARDIOLOGY CLINIC | Age: 67
End: 2022-04-12
Payer: MEDICARE

## 2022-04-12 VITALS
HEART RATE: 104 BPM | SYSTOLIC BLOOD PRESSURE: 142 MMHG | BODY MASS INDEX: 29.87 KG/M2 | WEIGHT: 162.31 LBS | OXYGEN SATURATION: 97 % | DIASTOLIC BLOOD PRESSURE: 94 MMHG | HEIGHT: 62 IN

## 2022-04-12 DIAGNOSIS — I10 ESSENTIAL HYPERTENSION: ICD-10-CM

## 2022-04-12 DIAGNOSIS — Z98.890 S/P MVR (MITRAL VALVE REPAIR): ICD-10-CM

## 2022-04-12 DIAGNOSIS — I34.0 SEVERE MITRAL REGURGITATION: Primary | ICD-10-CM

## 2022-04-12 DIAGNOSIS — E78.2 MIXED HYPERLIPIDEMIA: ICD-10-CM

## 2022-04-12 PROCEDURE — G8427 DOCREV CUR MEDS BY ELIG CLIN: HCPCS | Performed by: NURSE PRACTITIONER

## 2022-04-12 PROCEDURE — 99214 OFFICE O/P EST MOD 30 MIN: CPT | Performed by: NURSE PRACTITIONER

## 2022-04-12 PROCEDURE — 1123F ACP DISCUSS/DSCN MKR DOCD: CPT | Performed by: NURSE PRACTITIONER

## 2022-04-12 PROCEDURE — G8417 CALC BMI ABV UP PARAM F/U: HCPCS | Performed by: NURSE PRACTITIONER

## 2022-04-12 PROCEDURE — G8400 PT W/DXA NO RESULTS DOC: HCPCS | Performed by: NURSE PRACTITIONER

## 2022-04-12 PROCEDURE — 1036F TOBACCO NON-USER: CPT | Performed by: NURSE PRACTITIONER

## 2022-04-12 PROCEDURE — 3017F COLORECTAL CA SCREEN DOC REV: CPT | Performed by: NURSE PRACTITIONER

## 2022-04-12 PROCEDURE — 4040F PNEUMOC VAC/ADMIN/RCVD: CPT | Performed by: NURSE PRACTITIONER

## 2022-04-12 PROCEDURE — 1090F PRES/ABSN URINE INCON ASSESS: CPT | Performed by: NURSE PRACTITIONER

## 2022-04-12 RX ORDER — AMOXICILLIN 500 MG/1
2000 CAPSULE ORAL
Qty: 8 CAPSULE | Refills: 0 | Status: SHIPPED | OUTPATIENT
Start: 2022-04-12 | End: 2022-07-08

## 2022-04-12 RX ORDER — LISINOPRIL 20 MG/1
TABLET ORAL
Qty: 90 TABLET | Refills: 3 | Status: SHIPPED | OUTPATIENT
Start: 2022-04-12 | End: 2022-08-30 | Stop reason: SDUPTHER

## 2022-04-12 RX ORDER — AMLODIPINE BESYLATE 10 MG/1
10 TABLET ORAL DAILY
Qty: 90 TABLET | Refills: 3 | Status: SHIPPED | OUTPATIENT
Start: 2022-04-12 | End: 2022-08-30 | Stop reason: SDUPTHER

## 2022-04-12 NOTE — PATIENT INSTRUCTIONS
1. Resume the lisinopril 20 mg daily and the amlodipine 10 mg daily  2. Take these along with the metoprolol, aspirin and atorvastatin in the morning  3. Take amoxicillin 2,000 mg 1 hour prior dental work  4. Keep appointment with Dr. Jory Sharma in 3 months as scheduled  5. Bring your home BP monitor. Continue to check BP at home 2-3 times per week  6. Have the fasting blood work next month as planned       Ref.  Range 7/15/2020 10:26 3/2/2022 12:07   CHOLESTEROL, TOTAL, [id number] Latest Ref Range: 0 - 199 mg/dL 226 (H) 286 (H)   HDL Cholesterol Latest Ref Range: 40 - 60 mg/dL 76 (H) 87 (H)   LDL Calculated Latest Ref Range: <100 mg/dL 122 (H) 173 (H)   Triglycerides Latest Ref Range: 0 - 150 mg/dL 141 130

## 2022-04-12 NOTE — PROGRESS NOTES
[facility]   Cardiac Evaluation    Primary Care Doctor:  Saranya Kirkland DO    Chief Complaint   Patient presents with    3 Month Follow-Up    Hypertension        Assessment:    1. Severe mitral regurgitation    2. S/P MVR (mitral valve repair)    3. Essential hypertension    4. Mixed hyperlipidemia        Plan:   1. Resume the lisinopril 20 mg daily and the amlodipine 10 mg daily  2. Take these along with the metoprolol, aspirin and atorvastatin in the morning  3. Take amoxicillin 2,000 mg 1 hour prior dental work  4. Keep appointment with Dr. Jory Sharma in 3 months as scheduled  5. Bring your home BP monitor. Continue to check BP at home 2-3 times per week    Vitals:    04/12/22 1316   BP: (!) 142/94   Site: Left Upper Arm   Position: Sitting   Cuff Size: Medium Adult   Pulse: 104   SpO2: 97%   Weight: 162 lb 5 oz (73.6 kg)   Height: 5' 2\" (1.575 m)       History of Present Illness:   I had the pleasure of seeing Delores Curry in follow up for MR s/p MV repair 2018, HTN, HLD and hx of ETOH with hyponatremia. At last visit in January 2022, her BP was elevated but admitted to missing medications. She was asked to return for recheck. Her BP is still elevated here. At home BP runs 140/ 80's. She checks about once weekly. She denies any chest pain, shortness of breath, palpitations. She complains of leg pain from sciatica. She works as  at [employer] but has to sit after 4 hours due to leg pain and fatigue. She needs to have dental work, has a consultation with dentist tomorrow. Dr. Elizabeth Whalen. Nannette Cabrera describes symptoms including fatigue but denies chest pain, dyspnea, palpitations, orthopnea, PND, early saiety, edema, syncope. NYHA:   II  ACC/ AHA Stage:    C    Past Medical History:   has a past medical history of Anxiety, ETOH abuse, Hyperlipidemia, and Hypertension.   Surgical History:   has a past surgical history that includes Bunionectomy; Mitral valvuloplasty (02/05/2018); transesophageal echocardiogram (02/05/2018); and Cardiac catheterization (01/15/2018). Social History:   reports that she quit smoking about 42 years ago. Her smoking use included cigarettes. She started smoking about 51 years ago. She has a 4.50 pack-year smoking history. She has never used smokeless tobacco. She reports current alcohol use of about 15.0 standard drinks of alcohol per week. She reports that she does not use drugs. Family History:   Family History   Problem Relation Age of Onset    High Blood Pressure Mother     Diabetes Mother        Home Medications:  Prior to Admission medications    Medication Sig Start Date End Date Taking?  Authorizing Provider   atorvastatin (LIPITOR) 40 MG tablet Take 1 tablet by mouth daily 3/3/22  Yes Melissa Maldonado MD   metoprolol succinate (TOPROL XL) 25 MG extended release tablet TAKE ONE TABLET BY MOUTH DAILY 1/20/22  Yes Melissa Maldonado MD   aspirin 81 MG EC tablet Take 1 tablet by mouth daily 2/10/18  Yes TIFFANIE Adams CNP   lisinopril (PRINIVIL;ZESTRIL) 20 MG tablet TAKE ONE TABLET BY MOUTH DAILY  Patient not taking: Reported on 4/12/2022 4/1/21   Brynn Ornelas DO   amLODIPine (NORVASC) 10 MG tablet Take 1 tablet by mouth daily  Patient not taking: Reported on 4/12/2022 7/17/20   TIFFANIE Smith CNP   lisinopril (PRINIVIL;ZESTRIL) 20 MG tablet Take 1 tablet by mouth daily 7/17/20   TIFFANIE Smith CNP        Allergies:  Hctz [hydrochlorothiazide]     Physical Examination:    Vitals:    04/12/22 1316   BP: (!) 142/94   Site: Left Upper Arm   Position: Sitting   Cuff Size: Medium Adult   Pulse: 104   SpO2: 97%   Weight: 162 lb 5 oz (73.6 kg)   Height: 5' 2\" (1.575 m)     Constitutional and General Appearance: Warm and dry, no apparent distress, normal coloration  HEENT:  Normocephalic, atraumatic  Respiratory:  · Normal excursion and expansion without use of accessory muscles  · Resp Auscultation: Clear to auscultation   Cardiovascular:  · The apical impulses not displaced  · Heart tones are crisp and normal  · JVP 8 cm H2O  · Regular rate and rhythm, mildly tachy, normal S1S2, no m/g/r  · Peripheral pulses are symmetrical and full  · There is no clubbing, cyanosis of the extremities.   · No BLE edema  · Pedal Pulses: 2+ and equal   Abdomen:  · No masses or tenderness  · Liver/Spleen: No Abnormalities Noted  Neurological/Psychiatric:  · Alert and oriented in all spheres  · Moves all extremities well  · Exhibits normal gait balance and coordination  · No abnormalities of mood, affect, memory, mentation, or behavior are noted    Lab Data:  Most recent lab results below reviewed in office    CBC:   Lab Results   Component Value Date    WBC 6.2 03/02/2022    WBC 8.1 07/30/2021    WBC 7.8 02/09/2018    RBC 4.55 03/02/2022    RBC 4.91 07/30/2021    RBC 2.80 02/09/2018    HGB 13.5 03/02/2022    HGB 15.1 07/30/2021    HGB 8.6 02/09/2018    HCT 40.6 03/02/2022    HCT 43.8 07/30/2021    HCT 24.9 02/09/2018    MCV 89.4 03/02/2022    MCV 89.3 07/30/2021    MCV 88.8 02/09/2018    RDW 14.5 03/02/2022    RDW 15.6 07/30/2021    RDW 13.7 02/09/2018     03/02/2022     07/30/2021     02/09/2018     BMP:  Lab Results   Component Value Date     03/02/2022     07/30/2021     07/15/2020    K 4.5 03/02/2022    K 4.5 07/30/2021    K 4.9 07/15/2020    K 4.1 02/09/2018    K 4.7 02/08/2018    K 5.2 02/08/2018     03/02/2022    CL 88 07/30/2021    CL 97 07/15/2020    CO2 19 03/02/2022    CO2 25 07/30/2021    CO2 23 07/15/2020    PHOS 4.2 12/17/2017    PHOS 3.6 12/16/2017    PHOS 3.3 12/15/2017    BUN 11 03/02/2022    BUN 36 07/30/2021    BUN 8 07/15/2020    CREATININE 0.9 03/02/2022    CREATININE 1.2 07/30/2021    CREATININE 0.6 07/15/2020     BNP:   Lab Results   Component Value Date    PROBNP 4,299 12/14/2017     LIPID:   Lab Results   Component Value Date    TRIG 130 03/02/2022    TRIG 141 07/15/2020 HDL 87 03/02/2022    HDL 76 07/15/2020    LDLCALC 173 03/02/2022    LDLCALC 122 07/15/2020       Cardiac Imaging:  Echo Oct 2019:   Left ventricular systolic function is normal with a visually estimated ejection fraction of 55%. The left ventricle is small in size with mild to moderate assymetric hypertrophy. No regional wall motion abnormalities are noted. Normal left ventricular diastolic function. Right ventricular systolic function is reduced. The left atrium appears mildly enlarged. An annuloplasty ring is seen in the mitral position. There is no evidence of mitral valve regurgitation. Systolic pulmonary artery pressure (SPAP) is normal and estimated at 20 mmHg (right atrial pressure 3 mmHg). Surgery: 2/5/18  Mitral valve repair using new chordae ×2 for P2 physio-Ring 32      CARDIAC CATH 1/15/18: IMPRESSION:  1. Angiographically normal coronary arteries. 2.  Severe eccentric mitral regurgitation from flail posterior leaflet. 3.  Elevated left ventricular filling pressure. 4.  Normal left ventricular systolic function. RECOMMENDATION:  The patient has no coronary artery disease. She can proceed with mitral valve surgery. RITA 12/15/17:    Normal left ventricular systolic function, with estimated ejection fraction of 55%. Mild thickening of the mitral leaflets. Flail posterior mitral leaflet. Eccentric anteriorly directed severe mitral regurgitation consistent with a flail posterior leaflet. Systolic reversal noted in left upper pulmonary vein. Severely dilated left atrium. Mild to moderate tricuspid regurgitation. Systolic pulmonary artery pressure (SPAP) estimated at 51 mmHg consistent with moderate pulmonary hypertension (RA pressure 8 mmHg). Moderate left pleural effusion. Echo 12/14/17:   Left ventricular systolic function is normal with ejection fraction estimated at 55%. No regional wall motion abnormalities.    Elevated left ventricular diastolic filling pressure. Severely dilated left atrium. Right atrium is mildly dilated. There is a flail posterior mitral leaflet. Eccentric anteriorly directed severe mitral regurgitation consistent with a flail posterior leaflet. Moderate tricuspid regurgitation. Systolic pulmonary artery pressure (SPAP) estimated at 82 mmHg consistent with severe pulmonary hypertension (RA pressure 15 mmHg). IVC size is dilated (>2.1 cm) and collapses < 50% with respiration consistent with markedly elevated RA pressure (15 mmHg).             I appreciate the opportunity of cooperating in the care of this individual.    Maame Mo, TIFFANIE - MADALYN, 4/12/2022, 1:25 PM

## 2022-05-03 DIAGNOSIS — Z79.899 MEDICATION MANAGEMENT: ICD-10-CM

## 2022-05-03 LAB
ALBUMIN SERPL-MCNC: 4.7 G/DL (ref 3.4–5)
ALP BLD-CCNC: 115 U/L (ref 40–129)
ALT SERPL-CCNC: 14 U/L (ref 10–40)
AST SERPL-CCNC: 18 U/L (ref 15–37)
BILIRUB SERPL-MCNC: 0.7 MG/DL (ref 0–1)
BILIRUBIN DIRECT: <0.2 MG/DL (ref 0–0.3)
BILIRUBIN, INDIRECT: NORMAL MG/DL (ref 0–1)
CHOLESTEROL, TOTAL: 171 MG/DL (ref 0–199)
HDLC SERPL-MCNC: 67 MG/DL (ref 40–60)
LDL CHOLESTEROL CALCULATED: 84 MG/DL
TOTAL PROTEIN: 7.5 G/DL (ref 6.4–8.2)
TRIGL SERPL-MCNC: 102 MG/DL (ref 0–150)
VLDLC SERPL CALC-MCNC: 20 MG/DL

## 2022-05-09 ENCOUNTER — TELEPHONE (OUTPATIENT)
Dept: CARDIOLOGY CLINIC | Age: 67
End: 2022-05-09

## 2022-07-06 DIAGNOSIS — Z79.2 PROPHYLACTIC ANTIBIOTIC: Primary | ICD-10-CM

## 2022-07-07 ENCOUNTER — TELEPHONE (OUTPATIENT)
Dept: CARDIOLOGY CLINIC | Age: 67
End: 2022-07-07

## 2022-07-07 NOTE — TELEPHONE ENCOUNTER
Called pt. Left message asking for return call to office to verify if she needs amoxicillin refilled at this time/is she having dental procedure, work done.   Received request from pt pharmacy

## 2022-07-07 NOTE — TELEPHONE ENCOUNTER
Pt called mhi and stated she needs amoxicillin for next week due to her having 2 more appts over the next 2 weeks. Please advise.

## 2022-07-08 RX ORDER — AMOXICILLIN 500 MG/1
CAPSULE ORAL
Qty: 8 CAPSULE | Refills: 1 | Status: SHIPPED | OUTPATIENT
Start: 2022-07-08

## 2022-07-08 NOTE — TELEPHONE ENCOUNTER
Pt having dental work/procedures done over the next 2 weeks.  Pending script sent to [address] 4/12/22  NOV 7/19/22 KARYN

## 2022-08-30 ENCOUNTER — TELEPHONE (OUTPATIENT)
Dept: CARDIOLOGY CLINIC | Age: 67
End: 2022-08-30

## 2022-08-30 ENCOUNTER — OFFICE VISIT (OUTPATIENT)
Dept: CARDIOLOGY CLINIC | Age: 67
End: 2022-08-30
Payer: MEDICARE

## 2022-08-30 VITALS
DIASTOLIC BLOOD PRESSURE: 80 MMHG | SYSTOLIC BLOOD PRESSURE: 130 MMHG | HEART RATE: 120 BPM | BODY MASS INDEX: 29.35 KG/M2 | HEIGHT: 62 IN | OXYGEN SATURATION: 98 % | WEIGHT: 159.5 LBS

## 2022-08-30 DIAGNOSIS — Z98.890 S/P MVR (MITRAL VALVE REPAIR): ICD-10-CM

## 2022-08-30 DIAGNOSIS — Z87.891 HISTORY OF TOBACCO ABUSE: ICD-10-CM

## 2022-08-30 DIAGNOSIS — I48.0 PAROXYSMAL ATRIAL FIBRILLATION (HCC): ICD-10-CM

## 2022-08-30 DIAGNOSIS — R00.0 TACHYCARDIA: Primary | ICD-10-CM

## 2022-08-30 DIAGNOSIS — Z79.899 MEDICATION MANAGEMENT: ICD-10-CM

## 2022-08-30 DIAGNOSIS — I10 ESSENTIAL HYPERTENSION: ICD-10-CM

## 2022-08-30 DIAGNOSIS — E78.5 HYPERLIPIDEMIA, UNSPECIFIED HYPERLIPIDEMIA TYPE: ICD-10-CM

## 2022-08-30 PROCEDURE — 93000 ELECTROCARDIOGRAM COMPLETE: CPT | Performed by: INTERNAL MEDICINE

## 2022-08-30 PROCEDURE — 99214 OFFICE O/P EST MOD 30 MIN: CPT | Performed by: INTERNAL MEDICINE

## 2022-08-30 PROCEDURE — 1123F ACP DISCUSS/DSCN MKR DOCD: CPT | Performed by: INTERNAL MEDICINE

## 2022-08-30 RX ORDER — ATORVASTATIN CALCIUM 40 MG/1
40 TABLET, FILM COATED ORAL DAILY
Qty: 90 TABLET | Refills: 3 | Status: SHIPPED | OUTPATIENT
Start: 2022-08-30

## 2022-08-30 RX ORDER — METOPROLOL SUCCINATE 25 MG/1
TABLET, EXTENDED RELEASE ORAL
Qty: 90 TABLET | Refills: 3 | Status: SHIPPED | OUTPATIENT
Start: 2022-08-30 | End: 2022-10-10

## 2022-08-30 RX ORDER — LISINOPRIL 20 MG/1
TABLET ORAL
Qty: 90 TABLET | Refills: 3 | Status: SHIPPED | OUTPATIENT
Start: 2022-08-30

## 2022-08-30 RX ORDER — WARFARIN SODIUM 5 MG/1
5 TABLET ORAL DAILY
Qty: 30 TABLET | Refills: 5 | Status: CANCELLED | OUTPATIENT
Start: 2022-08-30

## 2022-08-30 RX ORDER — AMLODIPINE BESYLATE 10 MG/1
10 TABLET ORAL DAILY
Qty: 90 TABLET | Refills: 3 | Status: SHIPPED | OUTPATIENT
Start: 2022-08-30

## 2022-08-30 NOTE — TELEPHONE ENCOUNTER
Monitor placed by [address] [place] [address]  Length of monitor 48 hr  Monitor ordered by [facility]  Kit ID [id number]  Activation successful prior to pt leaving office?  Yes

## 2022-08-30 NOTE — LETTER
[address] [place] [address] Cardiology - [address] [facility] [address] [facility]  Phone: [phone]  Fax: [phone]    Orlin Hill MD    August 30, 2022     Aron Lentz DO  [address]    Patient: Keo Rodriguez   MR Number: [id number]   YOB: 1955   Date of Visit: 8/30/2022       Dear Aron Lentz: Thank you for referring Isiah Marie to me for evaluation/treatment. Below are the relevant portions of my assessment and plan of care. If you have questions, please do not hesitate to call me. I look forward to following Nannette along with you.     Sincerely,      Orlin Hill MD

## 2022-08-30 NOTE — TELEPHONE ENCOUNTER
Pt was prescribed eliquis 5 mg twice daily, pt did some reach and found that this medication is going to cost upwards of 500.00 dollars, she can not afford that. Could [facility] suggest another medication. Please advise.

## 2022-08-30 NOTE — LETTER
[address] Cardiology - [address] [place] [address] [facility]  Phone: [phone]  Fax: [phone]           Fabiano Vee MD      August 30, 2022     Patient: Radha Patiño   MR Number: [id number]   YOB: 1955   Date of Visit: 8/30/2022       Dear Dr. Warren Raman ref. provider found: Thank you for referring Maddison Valentin to me for evaluation/treatment. Below are the relevant portions of my assessment and plan of care. If you have questions, please do not hesitate to call me. I look forward to following Nannette along with you.     Sincerely,        Fabiano Vee MD    CC providers:  Jovanny Campbell DO  [address] [place], [place] [id number]  [facility] [place]

## 2022-08-30 NOTE — PATIENT INSTRUCTIONS
Plan:  1. Continue current medication regimen as prescribed. 2. Education provided antibiotic is required before any procedure or tooth cleaning to reduce risk of infection given history of mitral valve repair. 3. Order EKG ~ atrial fibrillation    4. Order TSH ~ evaluate thyroid function  5. Discontinue aspirin  6. Start eliquis 5 mg twice daily~ anticoagulation for atrial fibrillation   ~ Prevention of stoke. Please take with food, monitor for any signs of bleeding. Call office or seek emergency room for evaluation. 7. Cardiac Event Monitor ~ 48 hours for atrial fibrillation   8. Refer to Electrophysiology ~ atrial fibrillation   9.  Follow up in 3 months with me

## 2022-08-30 NOTE — PROGRESS NOTES
[facility]   Cardiac Consultation    Referring Provider:  Arielle Mtz DO     Chief Complaint   Patient presents with    6 Month Follow-Up     Pt reports that she has been doing fine from a cardiac standpoint. No new symptoms to report today. Hypertension    Hyperlipidemia    Other     Sever mitral regurgitation        Subjective: Ms. Adeel Murillo presents for cardiology follow up for MR s/p MV ring repair, HTN, and HLD; no complaints today. History of Present Illness:  Ms. Zoë Beck is a 75 y.o. female with PMH severe MR and flail posterior leaflet s/p MV ring repair 2/5/2018 by Dr. Carly Gtz, HTN, and HLD. Most recent [facility] 1/12/2018 by Dr Grant Montgomery showed no evidence of CAD. Most recent ECHO 1/26/2021 EF of 55-60%. Abnormal (paradoxical) septal wall motion; LV normal in size with mild posterior hypertrophy. Sigmoid septum is present. Moderate-severe LAE; Rv systolic function is reduced. An annuloplasty ring is seen in the mitral position. Trace MR. The posterior MV leaflet is restricted in movement. Weight 01/04/22=164#. Today she states doing well. Weight 08/30/22= 159 lbs, down #4 pounds since 01/04/22. She is taking medication as prescribed, tolerating well. She states she usually remembers to take her medications now. She reports she has not taken metoprolol in a few days due to running out of supply. Patient currently denies any weight gain, edema, palpitations, chest pain, shortness of breath, dizziness, and syncope. She states she recently had dental work completed and she completed antibiotics prior. Past Medical History:   has a past medical history of Anxiety, ETOH abuse, Hyperlipidemia, and Hypertension. Surgical History:   has a past surgical history that includes Bunionectomy; Mitral valvuloplasty (02/05/2018); transesophageal echocardiogram (02/05/2018); and Cardiac catheterization (01/15/2018). Social History:   reports that she quit smoking about 1985.  Her smoking use included cigarettes. She started smoking about 52 years ago. She has a 4.50 pack-year smoking history. She has never used smokeless tobacco. She reports current alcohol use of about 15.0 standard drinks per week. She reports that she does not use drugs. Family History:  family history includes Diabetes in her mother; High Blood Pressure in her mother. Home Medications:  Prior to Admission medications    Medication Sig Start Date End Date Taking? Authorizing Provider   lisinopril (PRINIVIL;ZESTRIL) 20 MG tablet TAKE ONE TABLET BY MOUTH DAILY 4/12/22  Yes TIFFANIE Keith CNP   amLODIPine (NORVASC) 10 MG tablet Take 1 tablet by mouth daily 4/12/22  Yes TIFFANIE Keith CNP   atorvastatin (LIPITOR) 40 MG tablet Take 1 tablet by mouth daily 3/3/22  Yes Beatris Batres MD   metoprolol succinate (TOPROL XL) 25 MG extended release tablet TAKE ONE TABLET BY MOUTH DAILY 1/20/22  Yes Beatris Batres MD   amoxicillin (AMOXIL) 500 MG capsule TAKE 4 CAPSULES BY MOUTH ONCE AS NEEDED ONE HOUR PRIOR TO DENTAL WORK  Patient not taking: Reported on 8/30/2022 7/8/22   TIFFANIE Keith CNP   lisinopril (PRINIVIL;ZESTRIL) 20 MG tablet Take 1 tablet by mouth daily 7/17/20   TIFFANIE Keith CNP        Allergies:  Hctz [hydrochlorothiazide]     Review of Systems:   Constitutional: there has been no unanticipated weight loss. There's been no change in energy level, sleep pattern, or activity level. Eyes: No visual changes or diplopia. No scleral icterus. ENT: No Headaches, hearing loss or vertigo. No mouth sores or sore throat. Cardiovascular: Reviewed in HPI  Respiratory: No cough or wheezing, no sputum production. No hematemesis. Gastrointestinal: No abdominal pain, appetite loss, blood in stools. No change in bowel or bladder habits. Genitourinary: No dysuria, trouble voiding, or hematuria. Musculoskeletal:  No gait disturbance, weakness or joint complaints.   Integumentary: No rash or pruritis. Neurological: No headache, diplopia, change in muscle strength, numbness or tingling. No change in gait, balance, coordination, mood, affect, memory, mentation, behavior. Psychiatric: No anxiety, no depression. Endocrine: No malaise, fatigue or temperature intolerance. No excessive thirst, fluid intake, or urination. No tremor. Hematologic/Lymphatic: No abnormal bruising or bleeding, blood clots or swollen lymph nodes. Allergic/Immunologic: No nasal congestion or hives. Physical Examination:    Vitals:    08/30/22 1018   BP: 130/80   Pulse: (!) 120   SpO2: 98%       Constitutional and General Appearance: NAD   Respiratory:  Normal excursion and expansion without use of accessory muscles  Resp Auscultation: Normal breath sounds without dullness  Cardiovascular: The apical impulses not displaced  Heart tones are crisp and normal  Cervical veins are not engorged  The carotid upstroke is normal in amplitude and contour without delay or bruit  Irregularly irregular and tachycardic; S1S2, No S3, No Murmur  Peripheral pulses are symmetrical and full  There is no clubbing, cyanosis of the extremities. No edema  Femoral Arteries: 2+ and equal  Pedal Pulses: 2+ and equal   Abdomen:  No masses or tenderness  Liver/Spleen: No Abnormalities Noted  Neurological/Psychiatric:  Alert and oriented in all spheres  Moves all extremities well  Exhibits normal gait balance and coordination  No abnormalities of mood, affect, memory, mentation, or behavior are noted  Skin:  Skin: warm and dry.     Lab Results   Component Value Date    CHOL 171 05/03/2022    CHOL 286 (H) 03/02/2022    CHOL 226 (H) 07/15/2020     Lab Results   Component Value Date    TRIG 102 05/03/2022    TRIG 130 03/02/2022    TRIG 141 07/15/2020     Lab Results   Component Value Date    HDL 67 (H) 05/03/2022    HDL 87 (H) 03/02/2022    HDL 76 (H) 07/15/2020     Lab Results   Component Value Date    LDLCALC 84 05/03/2022    LDLCALC 173 (H) 03/02/2022 LDLCALC 122 (H) 07/15/2020     Lab Results   Component Value Date    LABVLDL 20 05/03/2022    LABVLDL 26 03/02/2022    LABVLDL 28 07/15/2020     No results found for: CHOLHDMAGANATIO    [id number] Score for Atrial Fibrillation Stroke Risk   Risk   Factors  Component Value   C CHF No 0   H HTN Yes 1   A2 Age >= 75 No,  (76 y.o.) 0   D DM No 0   S2 Prior Stroke/TIA No 0   V Vascular Disease No 0   A Age 74-69 Yes,  (76 y.o.) 1   Sc Sex female 1    [id number]  Score  3   Score last updated 8/30/22 12:12 PM EDT    Assessment:     1. Essential hypertension: Improved and adequately controlled and will continue current medical regimen. 2. Mixed hyperlipidemia: I personally reviewed most recent labs from 5/3/22 in Epic (see above). Improved from 3/22 and well controlled and will continue current medical regimen lipitor 40mg daily. 3. S/P MVR (mitral valve repair): Surgery in Feb 2018 due to severe MR and posterior flail MV leaflet found incidentally on ECHO by Dr. Lianne Galaviz. Dr. Iza Galvin performed minimally invasive MV repair afterwards. Most recent ECHO 1/26/2021 EF of 55-60%. An annuloplasty ring is seen in the mitral position. Trace MR     4. Atrial fibrillation: New-onset and found incidentally based on pulse and ausculation. She is asymptomatic and unknown duration of atrial arrhythmia. EKG today shows coarse afib 109bpm; NST change. Will check 48 hour holter monitor, start DOAC with eliquis for [facility], d/c baby aspirin, check TSH, and refer to EP partner. Plan:  1. Continue current medication regimen as prescribed. 2. Education provided antibiotic is required before any procedure or tooth cleaning to reduce risk of infection given history of mitral valve repair. 3. Order EKG ~ atrial fibrillation    4. Order TSH ~ evaluate thyroid function  5. Discontinue aspirin  6. Start eliquis 5 mg twice daily~ anticoagulation for atrial fibrillation   ~ Prevention of stoke.  Please take with food, monitor for any signs of bleeding. Call office or seek emergency room for evaluation. 7. Cardiac Event Monitor ~ 48 hours for atrial fibrillation   8. Refer to Electrophysiology ~ atrial fibrillation   9. Follow up in 3 months with me    Scribe's attestation: This note was scribed in the presence of Jaron Curz by Giselle Gibbs RN     I, Dr. Jurgen Crook, personally performed the services described in this documentation, as scribed by the above signed scribe in my presence. It is both accurate and complete to my knowledge. I agree with the details independently gathered by the clinical support staff, while the remaining scribed note accurately describes my personal service to the patient. Cost of prescription medications and patient compliance have been reviewed with patient. All questions answered. Thank you for allowing me to participate in the care of this individual.    Jorge L Alonso.  Greta Hollis M.D., [facility]

## 2022-08-31 NOTE — TELEPHONE ENCOUNTER
Is there any financial program with company that would help with cost? If not, see if she can afford xarelto 20mg daily. If not, she will need coumadin clinic if cannot afford. Please let me know. Thanks.

## 2022-10-05 ENCOUNTER — OFFICE VISIT (OUTPATIENT)
Dept: CARDIOLOGY CLINIC | Age: 67
End: 2022-10-05
Payer: MEDICARE

## 2022-10-05 VITALS
SYSTOLIC BLOOD PRESSURE: 128 MMHG | HEART RATE: 100 BPM | HEIGHT: 62 IN | OXYGEN SATURATION: 98 % | WEIGHT: 158.2 LBS | BODY MASS INDEX: 29.11 KG/M2 | DIASTOLIC BLOOD PRESSURE: 68 MMHG

## 2022-10-05 DIAGNOSIS — G45.8 OTHER SPECIFIED TRANSIENT CEREBRAL ISCHEMIAS: Chronic | ICD-10-CM

## 2022-10-05 DIAGNOSIS — I48.0 PAROXYSMAL ATRIAL FIBRILLATION (HCC): ICD-10-CM

## 2022-10-05 DIAGNOSIS — I10 ESSENTIAL HYPERTENSION: ICD-10-CM

## 2022-10-05 DIAGNOSIS — I48.0 PAROXYSMAL ATRIAL FIBRILLATION (HCC): Primary | ICD-10-CM

## 2022-10-05 PROCEDURE — 3017F COLORECTAL CA SCREEN DOC REV: CPT | Performed by: INTERNAL MEDICINE

## 2022-10-05 PROCEDURE — G8400 PT W/DXA NO RESULTS DOC: HCPCS | Performed by: INTERNAL MEDICINE

## 2022-10-05 PROCEDURE — 99204 OFFICE O/P NEW MOD 45 MIN: CPT | Performed by: INTERNAL MEDICINE

## 2022-10-05 PROCEDURE — 93000 ELECTROCARDIOGRAM COMPLETE: CPT | Performed by: INTERNAL MEDICINE

## 2022-10-05 PROCEDURE — G8427 DOCREV CUR MEDS BY ELIG CLIN: HCPCS | Performed by: INTERNAL MEDICINE

## 2022-10-05 PROCEDURE — 1090F PRES/ABSN URINE INCON ASSESS: CPT | Performed by: INTERNAL MEDICINE

## 2022-10-05 PROCEDURE — G8484 FLU IMMUNIZE NO ADMIN: HCPCS | Performed by: INTERNAL MEDICINE

## 2022-10-05 PROCEDURE — 1036F TOBACCO NON-USER: CPT | Performed by: INTERNAL MEDICINE

## 2022-10-05 PROCEDURE — 1123F ACP DISCUSS/DSCN MKR DOCD: CPT | Performed by: INTERNAL MEDICINE

## 2022-10-05 PROCEDURE — 93228 REMOTE 30 DAY ECG REV/REPORT: CPT | Performed by: INTERNAL MEDICINE

## 2022-10-05 PROCEDURE — G8417 CALC BMI ABV UP PARAM F/U: HCPCS | Performed by: INTERNAL MEDICINE

## 2022-10-05 NOTE — PROGRESS NOTES
[address]   Cardiac Consultation    Date: 10/5/22  Patient Name: Gauri Cuellar  YOB: 1955    Primary Care Physician: Janae Asif DO    CHIEF COMPLAINT:   Chief Complaint   Patient presents with    New Patient    Atrial Fibrillation     HPI:  Gauri Cuellar is a 75 y.o. female with PMH significant for severe MR and flail posterior leaflet s/p MV ring repair in 2018. HTN, and HLD. Patient was seen by cardiology for follow up on 8/30/2022 and new onset AF was found incidentally. She was started on Eliquis. Patient wore a cardiac event monitor from 8/30/2022 to 9/2/2022 which demonstrated  SR with an average HR of 94 (). PAC burden 0.05%, PVC burden 0.1%. Today, 10/5/2022, she reports that she is doing ok. She does not think that she feels any irregular heart beats. She works part time as a  at [employer], she tolerates this activity well. She is taking her medications as prescribed. Denies recent issues with bleeding or bruising. Patient denies current edema, chest pain, sob, palpitations, dizziness or syncope. Past Medical History:   has a past medical history of Anxiety, ETOH abuse, Hyperlipidemia, and Hypertension. Surgical History:   has a past surgical history that includes Bunionectomy; Mitral valvuloplasty (02/05/2018); transesophageal echocardiogram (02/05/2018); and Cardiac catheterization (01/15/2018). Social History:   reports that she quit smoking about 43 years ago. Her smoking use included cigarettes. She started smoking about 52 years ago. She has a 4.50 pack-year smoking history. She has never used smokeless tobacco. She reports current alcohol use of about 15.0 standard drinks per week. She reports that she does not use drugs. Family History:  family history includes Diabetes in her mother; High Blood Pressure in her mother.      Home Medications:  Outpatient Encounter Medications as of 10/5/2022   Medication Sig Dispense Refill metoprolol succinate (TOPROL XL) 25 MG extended release tablet TAKE ONE TABLET BY MOUTH DAILY 90 tablet 3    atorvastatin (LIPITOR) 40 MG tablet Take 1 tablet by mouth daily 90 tablet 3    amLODIPine (NORVASC) 10 MG tablet Take 1 tablet by mouth daily 90 tablet 3    lisinopril (PRINIVIL;ZESTRIL) 20 MG tablet TAKE ONE TABLET BY MOUTH DAILY 90 tablet 3    apixaban (ELIQUIS) 5 MG TABS tablet Take 1 tablet by mouth 2 times daily 60 tablet 3    amoxicillin (AMOXIL) 500 MG capsule TAKE 4 CAPSULES BY MOUTH ONCE AS NEEDED ONE HOUR PRIOR TO DENTAL WORK (Patient not taking: No sig reported) 8 capsule 1    [DISCONTINUED] lisinopril (PRINIVIL;ZESTRIL) 20 MG tablet Take 1 tablet by mouth daily 90 tablet 3     No facility-administered encounter medications on file as of 10/5/2022. Data:  ECG 10/5/22: Personally reviewed. All current cardiac medications reviewed and adjusted accordingly  10/5/22    Echo 1/26/2021   Summary   -- Left ventricular systolic function is normal with a visually estimated   ejection fraction of 55-60%. Abnormal (paradoxical) septal wall motion   consistent with left bundle branch block. The left ventricle is normal in   size with mild posterior hypertrophy. Sigmoid septum is present. Indeterminate diastolic function due to mitral valve repair. -- The left atrium is moderately to severely dilated. -- Right ventricular systolic function is reduced. -- An annuloplasty ring is seen in the mitral position. The posterior mitral   valve leaflet is restricted in movement. -- Systolic pulmonary artery pressure (SPAP) is normal and estimated at 21   mmHg (right atrial pressure 3 mmHg). Allergies:  Hctz [hydrochlorothiazide]     Review of Systems   Constitutional: Negative. HENT: Negative. Eyes: Negative. Respiratory: Negative. Cardiovascular: Negative. Gastrointestinal: Negative. Genitourinary: Negative. Musculoskeletal: Negative. Skin: Negative.     Neurological: Negative. Hematological: Negative. Psychiatric/Behavioral: Negative. /68   Pulse 100   Ht 5' 2\" (1.575 m)   Wt 158 lb 3.2 oz (71.8 kg)   SpO2 98%   BMI 28.94 kg/m²       Objective:  Physical Exam   Constitutional: She is oriented to person, place, and time. She appears well-developed and well-nourished. HENT:   Head: Normocephalic and atraumatic. Eyes: Pupils are equal, round, and reactive to light. Neck: Normal range of motion. Cardiovascular: Normal rate, regular rhythm and normal heart sounds. Pulmonary/Chest: Effort normal and breath sounds normal.   Abdominal: Soft. No tenderness. Musculoskeletal: Normal range of motion. She exhibits no edema. Neurological: She is alert and oriented to person, place, and time. Skin: Skin is warm and dry. Psychiatric: She has a normal mood and affect. Assessment:  PAT- 28 day VC ordered at [address] 10/5/2022  S/P MV repair  Left atrial enlargement    Plan:  28 day vital connect monitor. Continue Eliquis for now. Can consider coming off of this based on results. Increase metoprolol to 50 mg daily. Follow up in 3 months. QUALITY MEASURES  1. Tobacco Cessation Counseling: NA  2. Retake of BP if >140/90:   NA  3. Documentation to PCP/referring for new patient:  Sent to PCP at close of office visit  4. CAD patient on anti-platelet: NA  5. CAD patient on STATIN therapy:  Yes  6. Patient with CHF and aFib on anticoagulation:  Yes      Naseem Hurt M.D.     Gilda Ibrahim RN, am scribing for and in the presence of Dr. Naseem Hurt. 10/05/22 10:56 AM   Lindsey Lai RN      I, Dr. Naseem Hurt, personally performed the services described in this documentation as scribed by Lindsey Lai RN in my presence, and it is both accurate and complete.

## 2022-10-05 NOTE — PATIENT INSTRUCTIONS
Plan:  28 day vital connect monitor. Continue Eliquis for now. Can consider coming off of this based on results. Increase metoprolol to 50 mg daily. Follow up in 3 months.

## 2022-10-10 RX ORDER — METOPROLOL SUCCINATE 50 MG/1
50 TABLET, EXTENDED RELEASE ORAL DAILY
Qty: 30 TABLET | Refills: 3 | Status: SHIPPED | OUTPATIENT
Start: 2022-10-10

## 2022-11-18 PROCEDURE — 93228 REMOTE 30 DAY ECG REV/REPORT: CPT | Performed by: INTERNAL MEDICINE

## 2022-11-21 DIAGNOSIS — I48.0 PAROXYSMAL ATRIAL FIBRILLATION (HCC): Primary | ICD-10-CM

## 2022-11-29 ENCOUNTER — OFFICE VISIT (OUTPATIENT)
Dept: CARDIOLOGY CLINIC | Age: 67
End: 2022-11-29
Payer: MEDICARE

## 2022-11-29 VITALS
HEART RATE: 96 BPM | DIASTOLIC BLOOD PRESSURE: 70 MMHG | SYSTOLIC BLOOD PRESSURE: 130 MMHG | OXYGEN SATURATION: 99 % | HEIGHT: 62 IN | BODY MASS INDEX: 29.44 KG/M2 | WEIGHT: 160 LBS

## 2022-11-29 DIAGNOSIS — Z98.890 S/P MVR (MITRAL VALVE REPAIR): ICD-10-CM

## 2022-11-29 DIAGNOSIS — Z83.3 FAMILY HISTORY OF DIABETES MELLITUS (DM): ICD-10-CM

## 2022-11-29 DIAGNOSIS — I48.0 PAROXYSMAL ATRIAL FIBRILLATION (HCC): Primary | ICD-10-CM

## 2022-11-29 DIAGNOSIS — E78.2 MIXED HYPERLIPIDEMIA: ICD-10-CM

## 2022-11-29 DIAGNOSIS — I10 ESSENTIAL HYPERTENSION: ICD-10-CM

## 2022-11-29 DIAGNOSIS — Z87.891 HISTORY OF TOBACCO ABUSE: ICD-10-CM

## 2022-11-29 PROCEDURE — G8400 PT W/DXA NO RESULTS DOC: HCPCS | Performed by: INTERNAL MEDICINE

## 2022-11-29 PROCEDURE — 3017F COLORECTAL CA SCREEN DOC REV: CPT | Performed by: INTERNAL MEDICINE

## 2022-11-29 PROCEDURE — G8417 CALC BMI ABV UP PARAM F/U: HCPCS | Performed by: INTERNAL MEDICINE

## 2022-11-29 PROCEDURE — 3074F SYST BP LT 130 MM HG: CPT | Performed by: INTERNAL MEDICINE

## 2022-11-29 PROCEDURE — 3078F DIAST BP <80 MM HG: CPT | Performed by: INTERNAL MEDICINE

## 2022-11-29 PROCEDURE — 1036F TOBACCO NON-USER: CPT | Performed by: INTERNAL MEDICINE

## 2022-11-29 PROCEDURE — G8484 FLU IMMUNIZE NO ADMIN: HCPCS | Performed by: INTERNAL MEDICINE

## 2022-11-29 PROCEDURE — 99214 OFFICE O/P EST MOD 30 MIN: CPT | Performed by: INTERNAL MEDICINE

## 2022-11-29 PROCEDURE — 1090F PRES/ABSN URINE INCON ASSESS: CPT | Performed by: INTERNAL MEDICINE

## 2022-11-29 PROCEDURE — 1123F ACP DISCUSS/DSCN MKR DOCD: CPT | Performed by: INTERNAL MEDICINE

## 2022-11-29 PROCEDURE — G8427 DOCREV CUR MEDS BY ELIG CLIN: HCPCS | Performed by: INTERNAL MEDICINE

## 2022-11-29 RX ORDER — METOPROLOL SUCCINATE 50 MG/1
50 TABLET, EXTENDED RELEASE ORAL DAILY
Qty: 30 TABLET | Refills: 3 | Status: SHIPPED | OUTPATIENT
Start: 2022-11-29

## 2022-11-29 NOTE — LETTER
Nannette Cabrera  1955. [facility]   Cardiac Consultation    Referring Provider:  Marcelle Verduzco DO     Chief Complaint   Patient presents with    3 Month Follow-Up    Atrial Fibrillation    Hypertension    Hyperlipidemia          Subjective: Ms. Hernan Noe presents for cardiology follow up for MR s/p MV ring repair, HTN, and HLD; Today without complaints    History of Present Illness:  Ms. Bessy Barroso is a 75 y.o. female with PMH severe MR and flail posterior leaflet s/p MV ring repair 2/5/2018 by Dr. Gavino Duran, HTN, PAF dx 8/22 on eliquis (Dr. Marta Medina), and HLD. Most recent [facility] 1/12/2018 negative for CAD. Most recent ECHO 1/26/2021 EF of 55-60%. Abnormal (paradoxical) septal wall motion; Moderate-severe LAE; Rv systolic function is reduced. An annuloplasty ring is seen in the mitral position. Trace MR. The posterior MV leaflet is restricted in movement. Since last OV dx PAF and started Eliquis 5 mg BID with d/c aspirin. MIKE from 8/30/2022 to 9/2/2022  demonstrated SR with an average HR of 94 (). PAC burden 0.05%, PVC burden 0.1%. She established care with  with EP, last office visit 10/05/22.  increased metoprolol to 50 mg once daily and ordered a 28 day MIKE. Most recent Vital Connect MIKE 10/05-11/01/22 showing SR with rare PAC, PVC's, and nocturnal Mobitz 1 AV Block. Today she states she is taking medications as prescribed to her knowledge, she does not have list with her today. She reports she is able to afford medication. Denies recent issues with bleeding or bruising. She reports she does not check her BP at home, she does have a machine that has not been assessed for accuracy. Patient currently doing well and denies any weight gain, edema, palpitations, chest pain, shortness of breath, dizziness, and syncope.      Weight up 1# from 8/22 (160# today)    Past Medical History:   has a past medical history of Anxiety, ETOH abuse, Hyperlipidemia, and Hypertension. Surgical History:   has a past surgical history that includes Bunionectomy; Mitral valvuloplasty (02/05/2018); transesophageal echocardiogram (02/05/2018); and Cardiac catheterization (01/15/2018). Social History:   reports that she quit smoking about 1985. Her smoking use included cigarettes. She started smoking about 52 years ago. She has a 4.50 pack-year smoking history. She has never used smokeless tobacco. She reports current alcohol use of about 15.0 standard drinks per week. She reports that she does not use drugs. Family History:  family history includes Diabetes in her mother; High Blood Pressure in her mother. Home Medications:  Prior to Admission medications    Medication Sig Start Date End Date Taking? Authorizing Provider   metoprolol succinate (TOPROL XL) 50 MG extended release tablet Take 1 tablet by mouth daily TAKE ONE TABLET BY MOUTH DAILY 10/10/22  Yes Zina Matos MD   atorvastatin (LIPITOR) 40 MG tablet Take 1 tablet by mouth daily 8/30/22  Yes Anthony Kamara MD   amLODIPine (NORVASC) 10 MG tablet Take 1 tablet by mouth daily 8/30/22  Yes Anthony Kamara MD   lisinopril (PRINIVIL;ZESTRIL) 20 MG tablet TAKE ONE TABLET BY MOUTH DAILY 8/30/22  Yes Anthony Kamara MD   apixaban Marvetta Reddish) 5 MG TABS tablet Take 1 tablet by mouth 2 times daily 8/30/22  Yes Anthony Kamara MD   amoxicillin (AMOXIL) 500 MG capsule TAKE 4 CAPSULES BY MOUTH ONCE AS NEEDED ONE HOUR PRIOR TO DENTAL WORK  Patient not taking: No sig reported 7/8/22   TIFFANIE Vergara CNP   lisinopril (PRINIVIL;ZESTRIL) 20 MG tablet Take 1 tablet by mouth daily 7/17/20   TIFFANIE Vergara CNP        Allergies:  Hctz [hydrochlorothiazide]     Review of Systems:   · Constitutional: there has been no unanticipated weight loss. There's been no change in energy level, sleep pattern, or activity level. · Eyes: No visual changes or diplopia. No scleral icterus.   · ENT: No Headaches, hearing loss or vertigo. No mouth sores or sore throat. · Cardiovascular: Reviewed in HPI  · Respiratory: No cough or wheezing, no sputum production. No hematemesis. · Gastrointestinal: No abdominal pain, appetite loss, blood in stools. No change in bowel or bladder habits. · Genitourinary: No dysuria, trouble voiding, or hematuria. · Musculoskeletal:  No gait disturbance, weakness or joint complaints. · Integumentary: No rash or pruritis. · Neurological: No headache, diplopia, change in muscle strength, numbness or tingling. No change in gait, balance, coordination, mood, affect, memory, mentation, behavior. · Psychiatric: No anxiety, no depression. · Endocrine: No malaise, fatigue or temperature intolerance. No excessive thirst, fluid intake, or urination. No tremor. · Hematologic/Lymphatic: No abnormal bruising or bleeding, blood clots or swollen lymph nodes. · Allergic/Immunologic: No nasal congestion or hives. Physical Examination:    Vitals:    11/29/22 0922 11/29/22 0924 11/29/22 0944   BP: (!) 150/76 (!) 152/74  My retake 130/70   Site:   Right Upper Arm   Position:   Sitting   Pulse: 96     SpO2: 99%     Weight: 160 lb (72.6 kg)     Height: 5' 2\" (1.575 m)             Constitutional and General Appearance: NAD   Respiratory:  · Normal excursion and expansion without use of accessory muscles  · Resp Auscultation: Normal breath sounds without dullness. Clear no crackles or wheezing present. Cardiovascular:  · The apical impulses not displaced  · Heart tones are crisp and normal  · Cervical veins are not engorged  · The carotid upstroke is normal in amplitude and contour without delay or bruit  · Regular rate and rhythm ; S1S2, No S3, No Murmur  · Peripheral pulses are symmetrical and full  · There is no clubbing, cyanosis of the extremities.   · No edema  · Femoral Arteries: 2+ and equal  · Pedal Pulses: 2+ and equal   Abdomen:  · No masses or tenderness  · Liver/Spleen: No Abnormalities Noted  Neurological/Psychiatric:  · Alert and oriented in all spheres  · Moves all extremities well  · Exhibits normal gait balance and coordination  · No abnormalities of mood, affect, memory, mentation, or behavior are noted  Skin:  · Skin: warm and dry. Lab Results   Component Value Date    CHOL 171 05/03/2022    CHOL 286 (H) 03/02/2022    CHOL 226 (H) 07/15/2020     Lab Results   Component Value Date    TRIG 102 05/03/2022    TRIG 130 03/02/2022    TRIG 141 07/15/2020     Lab Results   Component Value Date    HDL 67 (H) 05/03/2022    HDL 87 (H) 03/02/2022    HDL 76 (H) 07/15/2020     Lab Results   Component Value Date    LDLCALC 84 05/03/2022    LDLCALC 173 (H) 03/02/2022    LDLCALC 122 (H) 07/15/2020     Lab Results   Component Value Date    LABVLDL 20 05/03/2022    LABVLDL 26 03/02/2022    LABVLDL 28 07/15/2020     No results found for: CHOLHDLRATIO    [id number] Score for Atrial Fibrillation Stroke Risk   Risk   Factors  Component Value   C CHF No 0   H HTN Yes 1   A2 Age >= 75 No,  (76 y.o.) 0   D DM No 0   S2 Prior Stroke/TIA Yes 2   V Vascular Disease No 0   A Age 74-69 Yes,  (76 y.o.) 1   Sc Sex female 1    [id number]  Score  5   Score last updated 8/30/22 12:12 PM EDT    Assessment:     1. Essential hypertension: Elevated initially in office but my repeat was normal. She will have home arm BP machine checked for accuracy and follow home readings with goal 130/80 or less. She is to call if not at goal and I can adjust accordingly. 2. Mixed hyperlipidemia: I personally reviewed most recent labs from 5/3/22 in Epic (see above). Improved from 3/22 and well controlled and will continue current medical regimen lipitor 40mg daily. 3. S/P MVR (mitral valve repair): Surgery in Feb 2018 due to severe MR and posterior flail MV leaflet found incidentally on ECHO. Dr. Lesvia Johnson performed minimally invasive MV repair afterwards. Most recent ECHO 1/26/2021 EF of 55-60%.  An annuloplasty ring is seen in the mitral position. Trace MR     4. Atrial fibrillation: New-onset dx 8/22 and found incidentally based on pulse and ausculation; Asymptomatic. Now on eliquis. Two monitors since have not demonstrated further afib. She will be seeing Dr. Navjot Alonso in 1/23 and mentioned possibly d/c'ing AC. Will defer AC issue to EP. Plan:  1. Continue to follow up as planned with  to discuss anticoagulation continuation. 2. Discussed any invasive procedure, dental procedures, or routine dental cleanings will need antibiotic therapy prior ~ with history of mitral valve ring.   ~ Take prescription 1 hour prior. 3. Check blood pressure at home goal ~ 130/80 or less  ~ Take blood pressure 3-4 time a week in upper arm and keep a log.   ~ Call office for further intervention if blood pressure is consistently above goal.  4. Continue current medication regimen. 5. Follow up in 1 year. Scribe's attestation: This note was scribed in the presence of Sudhir Da Silva by Chloe Owens RN     I, Dr. Maria Del Carmen Hammonds, personally performed the services described in this documentation, as scribed by the above signed scribe in my presence. It is both accurate and complete to my knowledge. I agree with the details independently gathered by the clinical support staff, while the remaining scribed note accurately describes my personal service to the patient. Cost of prescription medications and patient compliance have been reviewed with patient. All questions answered. Thank you for allowing me to participate in the care of this individual.    Dayron Brown.  Nilton Mckeon M.D., [facility] [place]

## 2022-11-29 NOTE — PROGRESS NOTES
[facility]   Cardiac Consultation    Referring Provider:  Klarissa Gonzalez DO     Chief Complaint   Patient presents with    3 Month Follow-Up    Atrial Fibrillation    Hypertension    Hyperlipidemia          Subjective: Ms. Michell Fernandez presents for cardiology follow up for MR s/p MV ring repair, HTN, and HLD; Today without complaints    History of Present Illness:  Ms. Juno Jones is a 75 y.o. female with PMH severe MR and flail posterior leaflet s/p MV ring repair 2/5/2018 by Dr. Andrés Grover, HTN, PAF dx 8/22 on eliquis (Dr. Leonor Aponte), and HLD. Most recent [address] 1/12/2018 negative for CAD. Most recent ECHO 1/26/2021 EF of 55-60%. Abnormal (paradoxical) septal wall motion; Moderate-severe LAE; Rv systolic function is reduced. An annuloplasty ring is seen in the mitral position. Trace MR. The posterior MV leaflet is restricted in movement. Since last OV dx PAF and started Eliquis 5 mg BID with d/c aspirin. MIKE from 8/30/2022 to 9/2/2022  demonstrated SR with an average HR of 94 (). PAC burden 0.05%, PVC burden 0.1%. She established care with  with EP, last office visit 10/05/22.  increased metoprolol to 50 mg once daily and ordered a 28 day MIKE. Most recent [facility] MIKE 10/05-11/01/22 showing SR with rare PAC, PVC's, and nocturnal Mobitz 1 AV Block. Today she states she is taking medications as prescribed to her knowledge, she does not have list with her today. She reports she is able to afford medication. Denies recent issues with bleeding or bruising. She reports she does not check her BP at home, she does have a machine that has not been assessed for accuracy. Patient currently doing well and denies any weight gain, edema, palpitations, chest pain, shortness of breath, dizziness, and syncope. Weight up 1# from 8/22 (160# today)    Past Medical History:   has a past medical history of Anxiety, ETOH abuse, Hyperlipidemia, and Hypertension.     Surgical History:   has a past surgical history that includes Bunionectomy; Mitral valvuloplasty (02/05/2018); transesophageal echocardiogram (02/05/2018); and Cardiac catheterization (01/15/2018). Social History:   reports that she quit smoking about 1985. Her smoking use included cigarettes. She started smoking about 52 years ago. She has a 4.50 pack-year smoking history. She has never used smokeless tobacco. She reports current alcohol use of about 15.0 standard drinks per week. She reports that she does not use drugs. Family History:  family history includes Diabetes in her mother; High Blood Pressure in her mother. Home Medications:  Prior to Admission medications    Medication Sig Start Date End Date Taking? Authorizing Provider   metoprolol succinate (TOPROL XL) 50 MG extended release tablet Take 1 tablet by mouth daily TAKE ONE TABLET BY MOUTH DAILY 10/10/22  Yes Kumar Fernández MD   atorvastatin (LIPITOR) 40 MG tablet Take 1 tablet by mouth daily 8/30/22  Yes Geetha Schmitt MD   amLODIPine (NORVASC) 10 MG tablet Take 1 tablet by mouth daily 8/30/22  Yes Geetha Schmitt MD   lisinopril (PRINIVIL;ZESTRIL) 20 MG tablet TAKE ONE TABLET BY MOUTH DAILY 8/30/22  Yes Geetha Schmitt MD   apixaban Hermenia No) 5 MG TABS tablet Take 1 tablet by mouth 2 times daily 8/30/22  Yes Geetha Schmitt MD   amoxicillin (AMOXIL) 500 MG capsule TAKE 4 CAPSULES BY MOUTH ONCE AS NEEDED ONE HOUR PRIOR TO DENTAL WORK  Patient not taking: No sig reported 7/8/22   TIFFANIE Quintero CNP   lisinopril (PRINIVIL;ZESTRIL) 20 MG tablet Take 1 tablet by mouth daily 7/17/20   TIFFANIE Quintero CNP        Allergies:  Hctz [hydrochlorothiazide]     Review of Systems:   Constitutional: there has been no unanticipated weight loss. There's been no change in energy level, sleep pattern, or activity level. Eyes: No visual changes or diplopia. No scleral icterus. ENT: No Headaches, hearing loss or vertigo.  No mouth sores or sore throat. Cardiovascular: Reviewed in HPI  Respiratory: No cough or wheezing, no sputum production. No hematemesis. Gastrointestinal: No abdominal pain, appetite loss, blood in stools. No change in bowel or bladder habits. Genitourinary: No dysuria, trouble voiding, or hematuria. Musculoskeletal:  No gait disturbance, weakness or joint complaints. Integumentary: No rash or pruritis. Neurological: No headache, diplopia, change in muscle strength, numbness or tingling. No change in gait, balance, coordination, mood, affect, memory, mentation, behavior. Psychiatric: No anxiety, no depression. Endocrine: No malaise, fatigue or temperature intolerance. No excessive thirst, fluid intake, or urination. No tremor. Hematologic/Lymphatic: No abnormal bruising or bleeding, blood clots or swollen lymph nodes. Allergic/Immunologic: No nasal congestion or hives. Physical Examination:    Vitals:    11/29/22 0922 11/29/22 0924 11/29/22 0944   BP: (!) 150/76 (!) 152/74  My retake 130/70   Site:   Right Upper Arm   Position:   Sitting   Pulse: 96     SpO2: 99%     Weight: 160 lb (72.6 kg)     Height: 5' 2\" (1.575 m)             Constitutional and General Appearance: NAD   Respiratory:  Normal excursion and expansion without use of accessory muscles  Resp Auscultation: Normal breath sounds without dullness. Clear no crackles or wheezing present. Cardiovascular: The apical impulses not displaced  Heart tones are crisp and normal  Cervical veins are not engorged  The carotid upstroke is normal in amplitude and contour without delay or bruit  Regular rate and rhythm ; S1S2, No S3, No Murmur  Peripheral pulses are symmetrical and full  There is no clubbing, cyanosis of the extremities.   No edema  Femoral Arteries: 2+ and equal  Pedal Pulses: 2+ and equal   Abdomen:  No masses or tenderness  Liver/Spleen: No Abnormalities Noted  Neurological/Psychiatric:  Alert and oriented in all spheres  Moves all extremities well  Exhibits normal gait balance and coordination  No abnormalities of mood, affect, memory, mentation, or behavior are noted  Skin:  Skin: warm and dry. Lab Results   Component Value Date    CHOL 171 05/03/2022    CHOL 286 (H) 03/02/2022    CHOL 226 (H) 07/15/2020     Lab Results   Component Value Date    TRIG 102 05/03/2022    TRIG 130 03/02/2022    TRIG 141 07/15/2020     Lab Results   Component Value Date    HDL 67 (H) 05/03/2022    HDL 87 (H) 03/02/2022    HDL 76 (H) 07/15/2020     Lab Results   Component Value Date    LDLCALC 84 05/03/2022    LDLCALC 173 (H) 03/02/2022    LDLCALC 122 (H) 07/15/2020     Lab Results   Component Value Date    LABVLDL 20 05/03/2022    LABVLDL 26 03/02/2022    LABVLDL 28 07/15/2020     No results found for: CHOLHDLRATIO    [id number] Score for Atrial Fibrillation Stroke Risk   Risk   Factors  Component Value   C CHF No 0   H HTN Yes 1   A2 Age >= 75 No,  (76 y.o.) 0   D DM No 0   S2 Prior Stroke/TIA Yes 2   V Vascular Disease No 0   A Age 74-69 Yes,  (76 y.o.) 1   Sc Sex female 1    [id number]  Score  5   Score last updated 8/30/22 12:12 PM EDT    Assessment:     1. Essential hypertension: Elevated initially in office but my repeat was normal. She will have home arm BP machine checked for accuracy and follow home readings with goal 130/80 or less. She is to call if not at goal and I can adjust accordingly. 2. Mixed hyperlipidemia: I personally reviewed most recent labs from 5/3/22 in Epic (see above). Improved from 3/22 and well controlled and will continue current medical regimen lipitor 40mg daily. 3. S/P MVR (mitral valve repair): Surgery in Feb 2018 due to severe MR and posterior flail MV leaflet found incidentally on ECHO. Dr. Warren Wright performed minimally invasive MV repair afterwards. Most recent ECHO 1/26/2021 EF of 55-60%. An annuloplasty ring is seen in the mitral position. Trace MR     4.     Atrial fibrillation: New-onset dx 8/22 and found incidentally based on pulse and ausculation; Asymptomatic. Now on eliquis. Two monitors since have not demonstrated further afib. She will be seeing Dr. Mickie Zavala in 1/23 and mentioned possibly d/c'ing AC. Will defer AC issue to EP. Plan:  1. Continue to follow up as planned with  to discuss anticoagulation continuation. 2. Discussed any invasive procedure, dental procedures, or routine dental cleanings will need antibiotic therapy prior ~ with history of mitral valve ring.   ~ Take prescription 1 hour prior. 3. Check blood pressure at home goal ~ 130/80 or less  ~ Take blood pressure 3-4 time a week in upper arm and keep a log.   ~ Call office for further intervention if blood pressure is consistently above goal.  4. Continue current medication regimen. 5. Follow up in 1 year. Scribe's attestation: This note was scribed in the presence of Frankie Lynn by Renetta Prado RN     I, Dr. Gilberto Rodriguez, personally performed the services described in this documentation, as scribed by the above signed scribe in my presence. It is both accurate and complete to my knowledge. I agree with the details independently gathered by the clinical support staff, while the remaining scribed note accurately describes my personal service to the patient. Cost of prescription medications and patient compliance have been reviewed with patient. All questions answered. Thank you for allowing me to participate in the care of this individual.    Mary Ellen Russo M.D., [facility] [place]

## 2022-11-29 NOTE — PATIENT INSTRUCTIONS
Plan:  1. Continue to follow up as planned with  to discuss anticoagulation continuation. 2. Discussed any invasive procedure, dental procedures, or routine dental cleanings will need antibiotic therapy prior ~ with history of mitral valve ring.   ~ Take prescription 1 hour prior. 3. Check blood pressure at home goal ~ 130/80 or less  ~ Take blood pressure 3-4 time a week in upper arm and keep a log.   ~ Call office for further intervention if blood pressure is consistently above goal.  4. Continue current medication regimen. 5. Follow up in 1 year.

## 2023-01-09 DIAGNOSIS — Z79.2 PROPHYLACTIC ANTIBIOTIC: ICD-10-CM

## 2023-01-10 RX ORDER — AMOXICILLIN 500 MG/1
CAPSULE ORAL
Qty: 8 CAPSULE | Refills: 1 | Status: SHIPPED | OUTPATIENT
Start: 2023-01-10

## 2023-01-11 NOTE — PROGRESS NOTES
[facility]   Cardiac Consultation    Date: 1/11/23  Patient Name: Sofia Ford  YOB: 1955    Primary Care Physician: Seda Campa DO    CHIEF COMPLAINT:   No chief complaint on file. HPI:  Sofia Ford is a 75 y.o. female with PMH significant for severe MR and flail posterior leaflet s/p MV ring repair in 2018. HTN, and HLD. Patient was seen by cardiology for follow up on 8/30/2022 and new onset AF was found incidentally. She was started on Eliquis. Patient wore a cardiac event monitor from 8/30/2022 to 9/2/2022 which demonstrated  SR with an average HR of 94 (). PAC burden 0.05%, PVC burden 0.1%. At [address] 10/5/2022, she denied feeling any irregular heart beats. She wore a cardiac event monitor from 10/5-11/1/2022 which demonstrated SR with an average HR of 83 () BPM, PAC burden 0.08%, PVC burden 0.08%, 1 episode NSVT (4 beats), 4 episodes SVT(longest 6 beats) and nocturnal Mobitz I AVB. Today, 1/17/2023, ***      Past Medical History:   has a past medical history of Anxiety, ETOH abuse, Hyperlipidemia, and Hypertension. Surgical History:   has a past surgical history that includes Bunionectomy; Mitral valvuloplasty (02/05/2018); transesophageal echocardiogram (02/05/2018); and Cardiac catheterization (01/15/2018). Social History:   reports that she quit smoking about 43 years ago. Her smoking use included cigarettes. She started smoking about 52 years ago. She has a 4.50 pack-year smoking history. She has never used smokeless tobacco. She reports current alcohol use of about 15.0 standard drinks per week. She reports that she does not use drugs. Family History:  family history includes Diabetes in her mother; High Blood Pressure in her mother.      Home Medications:  Outpatient Encounter Medications as of 1/17/2023   Medication Sig Dispense Refill    amoxicillin (AMOXIL) 500 MG capsule TAKE 4 CAPSULES BY MOUTH ONCE AS NEEDED ONE HOUR PRIOR TO DENTAL WORK 8 capsule 1    apixaban (ELIQUIS) 5 MG TABS tablet Take 1 tablet by mouth 2 times daily 60 tablet 2    metoprolol succinate (TOPROL XL) 50 MG extended release tablet Take 1 tablet by mouth daily TAKE ONE TABLET BY MOUTH DAILY 30 tablet 3    atorvastatin (LIPITOR) 40 MG tablet Take 1 tablet by mouth daily 90 tablet 3    amLODIPine (NORVASC) 10 MG tablet Take 1 tablet by mouth daily 90 tablet 3    lisinopril (PRINIVIL;ZESTRIL) 20 MG tablet TAKE ONE TABLET BY MOUTH DAILY 90 tablet 3    [DISCONTINUED] lisinopril (PRINIVIL;ZESTRIL) 20 MG tablet Take 1 tablet by mouth daily 90 tablet 3     No facility-administered encounter medications on file as of 1/17/2023.     Data:  ECG 1/11/23: Personally reviewed.     All current cardiac medications reviewed and adjusted accordingly  1/11/23    Echo 1/26/2021   Summary   -- Left ventricular systolic function is normal with a visually estimated   ejection fraction of 55-60%. Abnormal (paradoxical) septal wall motion   consistent with left bundle branch block. The left ventricle is normal in   size with mild posterior hypertrophy. Sigmoid septum is present.   Indeterminate diastolic function due to mitral valve repair.   -- The left atrium is moderately to severely dilated.   -- Right ventricular systolic function is reduced.   -- An annuloplasty ring is seen in the mitral position. The posterior mitral   valve leaflet is restricted in movement.   -- Systolic pulmonary artery pressure (SPAP) is normal and estimated at 21   mmHg (right atrial pressure 3 mmHg).    Allergies:  Hctz [hydrochlorothiazide]     Review of Systems   Constitutional: Negative.    HENT: Negative.    Eyes: Negative.    Respiratory: Negative.    Cardiovascular: Negative.   Gastrointestinal: Negative.    Genitourinary: Negative.    Musculoskeletal: Negative.    Skin: Negative.    Neurological: Negative.    Hematological: Negative.    Psychiatric/Behavioral: Negative.    There were no vitals taken for this  visit.      Objective:  Physical Exam   Constitutional: She is oriented to person, place, and time. She appears well-developed and well-nourished.   HENT:   Head: Normocephalic and atraumatic.   Eyes: Pupils are equal, round, and reactive to light.   Neck: Normal range of motion.   Cardiovascular: Normal rate, regular rhythm and normal heart sounds.    Pulmonary/Chest: Effort normal and breath sounds normal.   Abdominal: Soft. No tenderness.   Musculoskeletal: Normal range of motion. She exhibits no edema.   Neurological: She is alert and oriented to person, place, and time.   Skin: Skin is warm and dry.   Psychiatric: She has a normal mood and affect.     Assessment:  PAT- 28 day VC ordered at OV 10/5/2022  S/P MV repair  Left atrial enlargement    Plan:  ***    QUALITY MEASURES  1. Tobacco Cessation Counseling: NA  2. Retake of BP if >140/90:   NA  3. Documentation to PCP/referring for new patient:  Sent to PCP at close of office visit  4. CAD patient on anti-platelet: NA  5. CAD patient on STATIN therapy:  Yes  6. Patient with CHF and aFib on anticoagulation:  Yes      ***    ***      Ehsan Patterson M.D.

## 2023-01-17 ENCOUNTER — OFFICE VISIT (OUTPATIENT)
Dept: CARDIOLOGY CLINIC | Age: 68
End: 2023-01-17

## 2023-01-17 VITALS
SYSTOLIC BLOOD PRESSURE: 120 MMHG | BODY MASS INDEX: 29.92 KG/M2 | OXYGEN SATURATION: 100 % | HEART RATE: 95 BPM | HEIGHT: 62 IN | WEIGHT: 162.6 LBS | DIASTOLIC BLOOD PRESSURE: 74 MMHG

## 2023-01-17 DIAGNOSIS — Z98.890 S/P MVR (MITRAL VALVE REPAIR): ICD-10-CM

## 2023-01-17 DIAGNOSIS — I48.0 PAROXYSMAL ATRIAL FIBRILLATION (HCC): Primary | ICD-10-CM

## 2023-01-17 NOTE — PROGRESS NOTES
[address]   Cardiac Follow up     Date: 1/17/23  Patient Name: Parth Johns  YOB: 1955    Primary Care Physician: Sharda Robles DO    CHIEF COMPLAINT:   Chief Complaint   Patient presents with    3 Month Follow-Up    Atrial Fibrillation       HPI:  Parth Johns is a 75 y.o. female with PMH significant for severe MR and flail posterior leaflet s/p MV ring repair in 2018. HTN, and HLD. Patient was seen by cardiology for follow up on 8/30/2022 and new onset AF was found incidentally. She was started on Eliquis. Patient wore a cardiac event monitor from 8/30/2022 to 9/2/2022 which demonstrated  SR with an average HR of 94 (). PAC burden 0.05%, PVC burden 0.1%. At [address] 10/5/2022, she denied feeling any irregular heart beats. In 10/2022 her Metoprolol was increased to 50 mg daily. She wore a cardiac event monitor from 10/5-11/1/2022 which demonstrated SR with an average HR of 83 () BPM, PAC burden 0.08%, PVC burden 0.08%, 1 episode NSVT (4 beats), 4 episodes SVT(longest 6 beats) and nocturnal Mobitz I AVB. Monitor completed to see the effects of increase in Metoprolol. Today, 1/17/2023, ECG demonstrates ectopic atrial rhythm, 1st degree AV block. She states she has been feeling well overall. She denies any issues with skin issues while wearing her second 30 day monitor. She had been under stress while wearing her second monitor. She reports having some digestive issues and is wondering if this is medication related. She will be 68 on 2/2/2023. Patient currently denies any weight gain, edema,chest pain, shortness of breath, dizziness, and syncope. Past Medical History:   has a past medical history of Anxiety, ETOH abuse, Hyperlipidemia, and Hypertension. Surgical History:   has a past surgical history that includes Bunionectomy; Mitral valvuloplasty (02/05/2018); transesophageal echocardiogram (02/05/2018); and Cardiac catheterization (01/15/2018).      Social History:   reports that she quit smoking about 43 years ago. Her smoking use included cigarettes. She started smoking about 52 years ago. She has a 4.50 pack-year smoking history. She has never used smokeless tobacco. She reports current alcohol use of about 15.0 standard drinks per week. She reports that she does not use drugs. Family History:  family history includes Diabetes in her mother; High Blood Pressure in her mother. Home Medications:  Outpatient Encounter Medications as of 1/17/2023   Medication Sig Dispense Refill    amoxicillin (AMOXIL) 500 MG capsule TAKE 4 CAPSULES BY MOUTH ONCE AS NEEDED ONE HOUR PRIOR TO DENTAL WORK 8 capsule 1    apixaban (ELIQUIS) 5 MG TABS tablet Take 1 tablet by mouth 2 times daily 60 tablet 2    metoprolol succinate (TOPROL XL) 50 MG extended release tablet Take 1 tablet by mouth daily TAKE ONE TABLET BY MOUTH DAILY 30 tablet 3    atorvastatin (LIPITOR) 40 MG tablet Take 1 tablet by mouth daily 90 tablet 3    amLODIPine (NORVASC) 10 MG tablet Take 1 tablet by mouth daily 90 tablet 3    lisinopril (PRINIVIL;ZESTRIL) 20 MG tablet TAKE ONE TABLET BY MOUTH DAILY 90 tablet 3    [DISCONTINUED] lisinopril (PRINIVIL;ZESTRIL) 20 MG tablet Take 1 tablet by mouth daily 90 tablet 3     No facility-administered encounter medications on file as of 1/17/2023. Data:  ECG 1/17/23: Personally reviewed. All current cardiac medications reviewed and adjusted accordingly  1/17/23    Echo 1/26/2021   Summary   -- Left ventricular systolic function is normal with a visually estimated   ejection fraction of 55-60%. Abnormal (paradoxical) septal wall motion   consistent with left bundle branch block. The left ventricle is normal in   size with mild posterior hypertrophy. Sigmoid septum is present. Indeterminate diastolic function due to mitral valve repair. -- The left atrium is moderately to severely dilated. -- Right ventricular systolic function is reduced.    -- An annuloplasty ring is seen in the mitral position. The posterior mitral   valve leaflet is restricted in movement. -- Systolic pulmonary artery pressure (SPAP) is normal and estimated at 21   mmHg (right atrial pressure 3 mmHg). Allergies:  Hctz [hydrochlorothiazide]     Review of Systems   Constitutional: Negative. HENT: Negative. Eyes: Negative. Respiratory: Negative. Cardiovascular: Negative. Gastrointestinal: Negative. Genitourinary: Negative. Musculoskeletal: Negative. Skin: Negative. Neurological: Negative. Hematological: Negative. Psychiatric/Behavioral: Negative. /74   Pulse 95   Ht 5' 2\" (1.575 m)   Wt 162 lb 9.6 oz (73.8 kg)   SpO2 100%   BMI 29.74 kg/m²       Objective:  Physical Exam   Constitutional: She is oriented to person, place, and time. She appears well-developed and well-nourished. HENT:   Head: Normocephalic and atraumatic. Eyes: Pupils are equal, round, and reactive to light. Neck: Normal range of motion. Cardiovascular: Normal rate, regular rhythm and normal heart sounds. Pulmonary/Chest: Effort normal and breath sounds normal.   Abdominal: Soft. No tenderness. Musculoskeletal: Normal range of motion. She exhibits no edema. Neurological: She is alert and oriented to person, place, and time. Skin: Skin is warm and dry. Psychiatric: She has a normal mood and affect. EKG today 1/17/2023  Atrial  Rhythm  -First degree A-V block   P:QRS - 1:1, Abnormal P axis, H Rate 95   Wilberto = 282  BORDERLINE RHYTHM    Assessment:  AT- 28 day VC ordered at [address] 10/5/2022  S/P MV repair in 2018  Left atrial enlargement    Plan: You can stop Eliquis- no atrial fibrillation seen over a 60 day period while wearing cardiac monitors   Recommend monitoring your heart rate at home with a pulse OX device. Call us if you note your heart rate being elevated.  We would recommend you coming in to have an EKG   Continue current medications at current doses   Restart Aspirin 81 mg daily   Discuss GI issues with PCP if they continue   Follow up with EP NP in 6 months and me 1 year     QUALITY MEASURES  1. Tobacco Cessation Counseling: NA  2. Retake of BP if >140/90:   NA  3. Documentation to PCP/referring for new patient:  Sent to PCP at close of office visit  4. CAD patient on anti-platelet: yes- restarted 1/17/2023  5. CAD patient on STATIN therapy:  Yes  6. Patient with CHF and aFib on anticoagulation:  Yes- stopped today 1/17/2023      Lyndonibe's attestation: This note was scribed in the presence of Dr. Faustino Travis M.D. By Slade Ackerman, Dr. Faustino Travis, personally performed the services described in this documentation as scribed by Heather Musa RN in my presence, and it is both accurate and complete.          Faustino Travis M.D.

## 2023-01-17 NOTE — PATIENT INSTRUCTIONS
Plan:  You can stop Eliquis- no atrial fibrillation seen over a 60 day period while wearing cardiac monitors   Recommend monitoring your heart rate at home with a pulse OX device. Call us if you note your heart rate being elevated.  We would recommend you coming in to have an EKG   Continue current medications at current doses   Restart Aspirin 81 mg daily   Discuss GI issues with PCP if they continue   Follow up with EP NP in 6 months and me 1 yea

## 2023-07-17 NOTE — PROGRESS NOTES
[facility]   Cardiac Follow up     Date: 7/19/23  Patient Name: Jonathan Nicole  YOB: 1955    Primary Care Physician: Gilford Staggers, DO    CHIEF COMPLAINT:   Chief Complaint   Patient presents with    6 Month Follow-Up    Atrial Fibrillation       HPI:  Jonathan Nicole is a 70 y.o. female with PMH significant for severe MR and flail posterior leaflet s/p MV ring repair in 2018. HTN, and HLD. Patient was seen by cardiology for follow up on 8/30/2022 and new onset AF was found incidentally. She was started on Eliquis. Patient wore a cardiac event monitor from 8/30/2022 to 9/2/2022 which demonstrated  SR with an average HR of 94 (). PAC burden 0.05%, PVC burden 0.1%. At [address] [place] [address] 10/5/2022, she denied feeling any irregular heart beats. In 10/2022 her Metoprolol was increased to 50 mg daily. She wore a cardiac event monitor from 10/5-11/1/2022 which demonstrated SR with an average HR of 83 () BPM, PAC burden 0.08%, PVC burden 0.08%, 1 episode NSVT (4 beats), 4 episodes SVT(longest 6 beats) and nocturnal Mobitz I AVB. Monitor completed to see the effects of increase in Metoprolol. On 1/17/2023, ECG demonstrates ectopic atrial rhythm, 1st degree AV block. She states she has been feeling well overall. She denies any issues with skin issues while wearing her second 30 day monitor. She had been under stress while wearing her second monitor. She reports having some digestive issues and is wondering if this is medication related. At conclusion of office visit Eliquis was discontinued. Today, 7/19/2023, patient reports feeling overall well. She works as a  and reports aching pains at the end of her work day. Patient denies current edema, chest pain, sob, palpitations, dizziness or syncope. Patient is taking all cardiac medications as prescribed and tolerates them well.      Past Medical History:   has a past medical history of Anxiety, ETOH abuse, Hyperlipidemia, and

## 2023-07-19 ENCOUNTER — OFFICE VISIT (OUTPATIENT)
Dept: CARDIOLOGY CLINIC | Age: 68
End: 2023-07-19
Payer: MEDICARE

## 2023-07-19 VITALS
BODY MASS INDEX: 30.47 KG/M2 | HEIGHT: 62 IN | DIASTOLIC BLOOD PRESSURE: 72 MMHG | HEART RATE: 87 BPM | WEIGHT: 165.6 LBS | SYSTOLIC BLOOD PRESSURE: 144 MMHG | OXYGEN SATURATION: 99 %

## 2023-07-19 DIAGNOSIS — I47.1 ATRIAL TACHYCARDIA (HCC): ICD-10-CM

## 2023-07-19 DIAGNOSIS — I48.0 PAROXYSMAL ATRIAL FIBRILLATION (HCC): Primary | ICD-10-CM

## 2023-07-19 PROBLEM — I47.19 ATRIAL TACHYCARDIA: Status: ACTIVE | Noted: 2023-07-19

## 2023-07-19 PROCEDURE — 3017F COLORECTAL CA SCREEN DOC REV: CPT | Performed by: INTERNAL MEDICINE

## 2023-07-19 PROCEDURE — 3074F SYST BP LT 130 MM HG: CPT | Performed by: INTERNAL MEDICINE

## 2023-07-19 PROCEDURE — 1123F ACP DISCUSS/DSCN MKR DOCD: CPT | Performed by: INTERNAL MEDICINE

## 2023-07-19 PROCEDURE — G8427 DOCREV CUR MEDS BY ELIG CLIN: HCPCS | Performed by: INTERNAL MEDICINE

## 2023-07-19 PROCEDURE — 1036F TOBACCO NON-USER: CPT | Performed by: INTERNAL MEDICINE

## 2023-07-19 PROCEDURE — G8417 CALC BMI ABV UP PARAM F/U: HCPCS | Performed by: INTERNAL MEDICINE

## 2023-07-19 PROCEDURE — 99214 OFFICE O/P EST MOD 30 MIN: CPT | Performed by: INTERNAL MEDICINE

## 2023-07-19 PROCEDURE — 1090F PRES/ABSN URINE INCON ASSESS: CPT | Performed by: INTERNAL MEDICINE

## 2023-07-19 PROCEDURE — 3078F DIAST BP <80 MM HG: CPT | Performed by: INTERNAL MEDICINE

## 2023-07-19 PROCEDURE — 93000 ELECTROCARDIOGRAM COMPLETE: CPT | Performed by: INTERNAL MEDICINE

## 2023-07-19 PROCEDURE — G8400 PT W/DXA NO RESULTS DOC: HCPCS | Performed by: INTERNAL MEDICINE

## 2023-07-19 NOTE — PATIENT INSTRUCTIONS
Plan:  Recommend [employer] Mobile device (Android version) to monitor heart rate/rhythm   -available at [facility], [facility] or [employer]  Continue cardiac medications as prescribed  Follow up with EP NP in 6 months

## 2023-09-25 DIAGNOSIS — I10 ESSENTIAL HYPERTENSION: ICD-10-CM

## 2023-09-25 DIAGNOSIS — Z98.890 S/P MVR (MITRAL VALVE REPAIR): ICD-10-CM

## 2023-09-25 DIAGNOSIS — E78.5 HYPERLIPIDEMIA, UNSPECIFIED HYPERLIPIDEMIA TYPE: ICD-10-CM

## 2023-09-25 NOTE — TELEPHONE ENCOUNTER
Dr. Shirin Clark, patient out of refills before next appointment. Rx(s) pended for your signature/modification as appropriate  - Per chart review, also appears patient due for updated labs    Last Visit: 7/19/23  Next Visit: 12/19/23    Thank you,  Mahsa Leach, PharmD, [facility], [address] [facility], toll free: [phone], option 1  ================================================================    POPULATION HEALTH CLINICAL PHARMACY: ADHERENCE REVIEW  Identified care gap per [employer]: fills at [facility]: Statin adherence    Patient also appears to be prescribed: ACE/ARB    ASSESSMENT    ACE/ARB ADHERENCE    Insurance Records claims through 09/10/2023 (Prior Year [address] = not reported; YTD PDC = 96% - PASSED):   Lisinopril 20 mg tablets last filled on 8/23/23 for 90 day supply. Next refill due: 11/21/23    Per Reconcile Dispense History:   Dispensed Days Supply Quantity Provider Pharmacy    LISINOPRIL  20 MG TABS 05/24/2023 90 90 tablet MD Azael Garrison [id number]. .. LISINOPRIL  20 MG TABS 02/17/2023 90 90 tablet MD Azael Garrison [id number]. .. Per chart review, no refills remaining     Dispensed Days Supply Quantity Provider Pharmacy   AMLODIPINE BESYLATE  10 MG TABS 06/20/2023 90 90 tablet MD Azael Garrison [id number]. .. AMLODIPINE BESYLATE  10 MG TABS 03/27/2023 90 90 tablet MD Azael Garrison [id number]. .. BP Readings from Last 3 Encounters:   07/19/23 (!) 144/72   01/17/23 120/74   11/29/22 130/70     CrCl cannot be calculated (Patient's most recent lab result is older than the maximum 180 days allowed. ).   Lab Results   Component Value Date    CREATININE 0.9 03/02/2022     Lab Results   Component Value Date    K 4.5 03/02/2022     STATIN ADHERENCE    Insurance Records claims through 09/10/2023 (Prior Year [address] = not reported; YTD [address] = FIRST FILL; Potential Fail Date: 9/29/23):

## 2023-09-26 RX ORDER — LISINOPRIL 20 MG/1
TABLET ORAL
Qty: 100 TABLET | Refills: 0 | Status: SHIPPED | OUTPATIENT
Start: 2023-09-26

## 2023-09-26 RX ORDER — AMLODIPINE BESYLATE 10 MG/1
10 TABLET ORAL DAILY
Qty: 100 TABLET | Refills: 0 | Status: SHIPPED | OUTPATIENT
Start: 2023-09-26

## 2023-09-26 RX ORDER — ATORVASTATIN CALCIUM 40 MG/1
40 TABLET, FILM COATED ORAL DAILY
Qty: 100 TABLET | Refills: 0 | Status: SHIPPED | OUTPATIENT
Start: 2023-09-26

## 2023-09-26 NOTE — TELEPHONE ENCOUNTER
For Pharmacy Admin Tracking Only    Program: Paul in place:  No  Recommendation Provided To: Provider: 3 via Note to Provider  Intervention Detail: Refill(s) Provided  Intervention Accepted By: Provider: 3  Gap Closed?: No   Time Spent (min): 15

## 2023-10-17 ENCOUNTER — TELEPHONE (OUTPATIENT)
Dept: FAMILY MEDICINE CLINIC | Age: 68
End: 2023-10-17

## 2023-10-17 DIAGNOSIS — Z98.890 S/P MVR (MITRAL VALVE REPAIR): ICD-10-CM

## 2023-10-17 DIAGNOSIS — I10 ESSENTIAL HYPERTENSION: ICD-10-CM

## 2023-10-17 DIAGNOSIS — I48.0 PAROXYSMAL ATRIAL FIBRILLATION (HCC): ICD-10-CM

## 2023-10-17 RX ORDER — METOPROLOL SUCCINATE 50 MG/1
50 TABLET, EXTENDED RELEASE ORAL DAILY
Qty: 30 TABLET | Refills: 3 | Status: SHIPPED | OUTPATIENT
Start: 2023-10-17

## 2023-10-17 RX ORDER — LISINOPRIL 20 MG/1
TABLET ORAL
Qty: 90 TABLET | Refills: 2 | Status: SHIPPED | OUTPATIENT
Start: 2023-10-17

## 2023-10-17 NOTE — TELEPHONE ENCOUNTER
[facility].  Write letter or pend an order and I will sign

## 2023-10-17 NOTE — TELEPHONE ENCOUNTER
----- Message from ZAN sent at 10/17/2023 11:35 AM EDT -----  Subject: Message to Provider    QUESTIONS  Information for Provider? Patient states she needs a prescription for her   handicap parking placard. She is requesting a call back to let her know   when that will be available for .  ---------------------------------------------------------------------------  --------------  Angela Fruithurst Ludmila  [phone]; OK to leave message on voicemail  ---------------------------------------------------------------------------  --------------  SCRIPT ANSWERS  Relationship to Patient?  Self

## 2023-12-21 NOTE — PROGRESS NOTES
[facility]   Cardiac Evaluation    Primary Care Doctor:  Jessica Acevedo DO    Chief Complaint   Patient presents with    Atrial Fibrillation    Hypertension    Hyperlipidemia    1 Year Follow Up        Assessment:    1. Severe mitral regurgitation    2. S/P MVR (mitral valve repair)    3. Paroxysmal atrial fibrillation (HCC)    4. Essential hypertension    5. Mixed hyperlipidemia        Plan:   No change in medicines  Echocardiogram to relook at mitral valve and heart function  Fasting blood work  - soon  Follow up with Dr. Rudi Rios in 1 year  Follow with Dr. Ehsan Patterson or EP NP in 3 months     Vitals:    01/02/24 0835   BP: 122/60   Pulse: 95   SpO2: 99%   Weight: 73.9 kg (163 lb)   Height: 1.575 m (5' 2\")       Primary Cardiologist: Dr. Rudi Rios/ Dr. Ehsan Patterson     History of Present Illness:   I had the pleasure of seeing Nannette Cabrera (68 y.o.) in follow up for severe MR s/p mitral valve ring (2018), hypertension, hyperlipidemia, PAF (found August 2022) and history of EtOH use.    Seen by EP.  AFib burden low by 2 event monitors.  No anticoagulation, aspirin only.     Her BP is good today.  HR up a bit.   HR at home .    Still under a lot of stress with son.  Sleep is poor  Works part time -  at [employer].     Nannette Cabrera describes symptoms including fatigue but denies chest pain, dyspnea, palpitations, orthopnea, PND, early saiety, edema, syncope.     NYHA:   II  ACC/ AHA Stage:    C    Past Medical History:   has a past medical history of Anxiety, ETOH abuse, Hyperlipidemia, Hypertension, Paroxysmal atrial fibrillation (HCC), and Transient cerebral ischemic attack, unspecified 4/17/16.  Surgical History:   has a past surgical history that includes Bunionectomy; Mitral valvuloplasty (02/05/2018); transesophageal echocardiogram (02/05/2018); and Cardiac catheterization (01/15/2018).   Social History:   reports that she quit smoking about 44 years

## 2024-01-02 ENCOUNTER — OFFICE VISIT (OUTPATIENT)
Dept: CARDIOLOGY CLINIC | Age: 69
End: 2024-01-02
Payer: MEDICARE

## 2024-01-02 VITALS
HEART RATE: 95 BPM | DIASTOLIC BLOOD PRESSURE: 60 MMHG | OXYGEN SATURATION: 99 % | SYSTOLIC BLOOD PRESSURE: 122 MMHG | WEIGHT: 163 LBS | BODY MASS INDEX: 30 KG/M2 | HEIGHT: 62 IN

## 2024-01-02 DIAGNOSIS — E78.5 HYPERLIPIDEMIA, UNSPECIFIED HYPERLIPIDEMIA TYPE: ICD-10-CM

## 2024-01-02 DIAGNOSIS — R73.9 HYPERGLYCEMIA, UNSPECIFIED: ICD-10-CM

## 2024-01-02 DIAGNOSIS — I34.0 SEVERE MITRAL REGURGITATION: Primary | ICD-10-CM

## 2024-01-02 DIAGNOSIS — I10 ESSENTIAL HYPERTENSION: ICD-10-CM

## 2024-01-02 DIAGNOSIS — E78.2 MIXED HYPERLIPIDEMIA: ICD-10-CM

## 2024-01-02 DIAGNOSIS — Z98.890 S/P MVR (MITRAL VALVE REPAIR): ICD-10-CM

## 2024-01-02 DIAGNOSIS — I48.0 PAROXYSMAL ATRIAL FIBRILLATION (HCC): ICD-10-CM

## 2024-01-02 DIAGNOSIS — E55.9 VITAMIN D DEFICIENCY, UNSPECIFIED: ICD-10-CM

## 2024-01-02 DIAGNOSIS — Z83.3 FAMILY HISTORY OF DIABETES MELLITUS (DM): ICD-10-CM

## 2024-01-02 PROCEDURE — 3017F COLORECTAL CA SCREEN DOC REV: CPT | Performed by: NURSE PRACTITIONER

## 2024-01-02 PROCEDURE — 1123F ACP DISCUSS/DSCN MKR DOCD: CPT | Performed by: NURSE PRACTITIONER

## 2024-01-02 PROCEDURE — G8484 FLU IMMUNIZE NO ADMIN: HCPCS | Performed by: NURSE PRACTITIONER

## 2024-01-02 PROCEDURE — G8417 CALC BMI ABV UP PARAM F/U: HCPCS | Performed by: NURSE PRACTITIONER

## 2024-01-02 PROCEDURE — 3078F DIAST BP <80 MM HG: CPT | Performed by: NURSE PRACTITIONER

## 2024-01-02 PROCEDURE — 1036F TOBACCO NON-USER: CPT | Performed by: NURSE PRACTITIONER

## 2024-01-02 PROCEDURE — 1090F PRES/ABSN URINE INCON ASSESS: CPT | Performed by: NURSE PRACTITIONER

## 2024-01-02 PROCEDURE — 3074F SYST BP LT 130 MM HG: CPT | Performed by: NURSE PRACTITIONER

## 2024-01-02 PROCEDURE — G8400 PT W/DXA NO RESULTS DOC: HCPCS | Performed by: NURSE PRACTITIONER

## 2024-01-02 PROCEDURE — 99214 OFFICE O/P EST MOD 30 MIN: CPT | Performed by: NURSE PRACTITIONER

## 2024-01-02 PROCEDURE — G8427 DOCREV CUR MEDS BY ELIG CLIN: HCPCS | Performed by: NURSE PRACTITIONER

## 2024-01-02 RX ORDER — ASPIRIN 81 MG/1
81 TABLET ORAL DAILY
COMMUNITY
End: 2024-01-02 | Stop reason: SDUPTHER

## 2024-01-02 RX ORDER — ASPIRIN 81 MG/1
81 TABLET ORAL DAILY
Qty: 90 TABLET | Refills: 3 | Status: SHIPPED | OUTPATIENT
Start: 2024-01-02

## 2024-01-02 RX ORDER — ATORVASTATIN CALCIUM 40 MG/1
40 TABLET, FILM COATED ORAL DAILY
Qty: 90 TABLET | Refills: 3 | Status: SHIPPED | OUTPATIENT
Start: 2024-01-02

## 2024-01-02 RX ORDER — METOPROLOL SUCCINATE 50 MG/1
50 TABLET, EXTENDED RELEASE ORAL DAILY
Qty: 90 TABLET | Refills: 3 | Status: SHIPPED | OUTPATIENT
Start: 2024-01-02

## 2024-01-02 RX ORDER — AMLODIPINE BESYLATE 10 MG/1
10 TABLET ORAL DAILY
Qty: 90 TABLET | Refills: 3 | Status: SHIPPED | OUTPATIENT
Start: 2024-01-02

## 2024-01-02 RX ORDER — LISINOPRIL 20 MG/1
20 TABLET ORAL DAILY
Qty: 90 TABLET | Refills: 3 | Status: SHIPPED | OUTPATIENT
Start: 2024-01-02

## 2024-01-02 NOTE — PATIENT INSTRUCTIONS
No change in medicines  Echocardiogram to relook at mitral valve and heart function - call [address] [place] [address] ([phone]) to schedule  Fasting blood work  - soon  Follow up with Dr. Rudi Rios in 1 year  Follow with Dr. Ehsan Patterson or EP NP in 3 months

## 2024-01-03 DIAGNOSIS — E55.9 VITAMIN D DEFICIENCY, UNSPECIFIED: ICD-10-CM

## 2024-01-03 DIAGNOSIS — Z83.3 FAMILY HISTORY OF DIABETES MELLITUS (DM): ICD-10-CM

## 2024-01-03 DIAGNOSIS — I48.0 PAROXYSMAL ATRIAL FIBRILLATION (HCC): ICD-10-CM

## 2024-01-03 DIAGNOSIS — I10 ESSENTIAL HYPERTENSION: ICD-10-CM

## 2024-01-03 DIAGNOSIS — E78.2 MIXED HYPERLIPIDEMIA: ICD-10-CM

## 2024-01-03 DIAGNOSIS — Z98.890 S/P MVR (MITRAL VALVE REPAIR): ICD-10-CM

## 2024-01-03 DIAGNOSIS — I34.0 SEVERE MITRAL REGURGITATION: ICD-10-CM

## 2024-01-03 DIAGNOSIS — R73.9 HYPERGLYCEMIA, UNSPECIFIED: ICD-10-CM

## 2024-01-04 ENCOUNTER — TELEPHONE (OUTPATIENT)
Dept: CARDIOLOGY CLINIC | Age: 69
End: 2024-01-04

## 2024-01-04 LAB
25(OH)D3 SERPL-MCNC: 13.9 NG/ML
ALBUMIN SERPL-MCNC: 4.6 G/DL (ref 3.4–5)
ALBUMIN/GLOB SERPL: 1.8 {RATIO} (ref 1.1–2.2)
ALP SERPL-CCNC: 108 U/L (ref 40–129)
ALT SERPL-CCNC: 16 U/L (ref 10–40)
ANION GAP SERPL CALCULATED.3IONS-SCNC: 9 MMOL/L (ref 3–16)
AST SERPL-CCNC: 20 U/L (ref 15–37)
BILIRUB SERPL-MCNC: 0.7 MG/DL (ref 0–1)
BUN SERPL-MCNC: 15 MG/DL (ref 7–20)
CALCIUM SERPL-MCNC: 9.2 MG/DL (ref 8.3–10.6)
CHLORIDE SERPL-SCNC: 101 MMOL/L (ref 99–110)
CHOLEST SERPL-MCNC: 176 MG/DL (ref 0–199)
CO2 SERPL-SCNC: 26 MMOL/L (ref 21–32)
CREAT SERPL-MCNC: 0.7 MG/DL (ref 0.6–1.2)
EST. AVERAGE GLUCOSE BLD GHB EST-MCNC: 105.4 MG/DL
FOLATE SERPL-MCNC: 15.97 NG/ML (ref 4.78–24.2)
GFR SERPLBLD CREATININE-BSD FMLA CKD-EPI: >60 ML/MIN/{1.73_M2}
GLUCOSE SERPL-MCNC: 89 MG/DL (ref 70–99)
HBA1C MFR BLD: 5.3 %
HDLC SERPL-MCNC: 76 MG/DL (ref 40–60)
LDLC SERPL CALC-MCNC: 84 MG/DL
POTASSIUM SERPL-SCNC: 4.5 MMOL/L (ref 3.5–5.1)
PROT SERPL-MCNC: 7.2 G/DL (ref 6.4–8.2)
SODIUM SERPL-SCNC: 136 MMOL/L (ref 136–145)
TRIGL SERPL-MCNC: 79 MG/DL (ref 0–150)
VIT B12 SERPL-MCNC: 424 PG/ML (ref 211–911)
VLDLC SERPL CALC-MCNC: 16 MG/DL

## 2024-01-04 RX ORDER — ERGOCALCIFEROL 1.25 MG/1
50000 CAPSULE ORAL WEEKLY
Qty: 12 CAPSULE | Refills: 0 | Status: CANCELLED | OUTPATIENT
Start: 2024-01-04

## 2024-01-04 NOTE — TELEPHONE ENCOUNTER
----- Message from TIFFANIE Granados - CNP sent at 1/4/2024  7:26 AM EST -----  Kidney and liver function panel stable.  Cholesterol panel stable.  Vitamin B 12 in normal range.  Folate and vitamin D level low.  I recommend she take vitamin D 50,000 units weekly for 12 weeks and then have this rechecked.  If she does not already take a multivitamin I would recommend a multivitamin or folic acid 1 mg daily.  Thank you, Geetha

## 2024-01-04 NOTE — TELEPHONE ENCOUNTER
Created telephone encounter. LMOM requesting a call back to the office.   Lab and Vitamin D pended. Awaiting call back.

## 2024-01-04 NOTE — TELEPHONE ENCOUNTER
Left message for patient to call back with normal results.     When she calls back let her know- A1c is normal.

## 2024-01-05 NOTE — TELEPHONE ENCOUNTER
Attempted to call home and there was an answer, but no one on the other line. LVM on mobile phone for pt to return call for message. Two results needed to relay- pasted below from separate results encounter as well as the results in previous notes.     Noemí Foster, RN Registered Nurse Signed Yesterday     Copy     Left message for patient to call back with normal results.      When she calls back let her know- A1c is normal.

## 2024-01-05 NOTE — TELEPHONE ENCOUNTER
Attempted to call pt on other results encounter. Pasting to new encounter and closing this one for continuity.

## 2024-01-09 NOTE — TELEPHONE ENCOUNTER
Spoke with the patient and relayed results message from [facility]. Patient VU. She will be using OTC supplements for now.

## 2024-02-27 DIAGNOSIS — Z79.2 PROPHYLACTIC ANTIBIOTIC: ICD-10-CM

## 2024-02-28 RX ORDER — AMOXICILLIN 500 MG/1
CAPSULE ORAL
Qty: 8 CAPSULE | Refills: 1 | OUTPATIENT
Start: 2024-02-28

## 2024-02-28 RX ORDER — AMOXICILLIN 500 MG/1
CAPSULE ORAL
Qty: 8 CAPSULE | Refills: 1 | Status: SHIPPED | OUTPATIENT
Start: 2024-02-28

## 2024-02-28 NOTE — TELEPHONE ENCOUNTER
LOV 01/02/2024    Spoke with pt who rescheduled her dentist appt for next week so that she could get the amoxicillin required one hour prior.

## 2024-04-08 NOTE — PROGRESS NOTES
atrial rhythm per ECG. Recommend [employer] to monitor heart rate and rhythm. Continue current medications.   PAF- post op-no recurrence seen on monitors. Recommend [employer].   S/P MV repair in 2018-Echo 2021 trace regurgitation  Left atrial enlargement    Plan:  Recommend [employer] Mobile device (Android version) to monitor heart rate/rhythm   -available at [employer], [employer] or [employer]  Continue cardiac medications as prescribed  Get echo as ordered by MIHAI Iniguez  Follow up with EP NP in 6 months     QUALITY MEASURES  1. Tobacco Cessation Counseling: NA  2. Retake of BP if >140/90:   NA  3. Documentation to PCP/referring for new patient:  Sent to PCP at close of office visit  4. CAD patient on anti-platelet: yes- restarted 1/17/2023  5. CAD patient on STATIN therapy:  Yes  6. Patient with CHF and aFib on anticoagulation:  Stopped 1/17/2023      Scribe's attestation:  This note was scribed in the presence of Dr. Ehsan Patterson MD by Angie Michelle RN    I, Dr. Ehsan Patterson, personally performed the services described in this documentation as scribed by Angie Michelle RN in my presence, and it is both accurate and complete.         Ehsan Patterson M.D.

## 2024-04-10 ENCOUNTER — OFFICE VISIT (OUTPATIENT)
Dept: CARDIOLOGY CLINIC | Age: 69
End: 2024-04-10
Payer: MEDICARE

## 2024-04-10 VITALS
OXYGEN SATURATION: 100 % | DIASTOLIC BLOOD PRESSURE: 84 MMHG | HEIGHT: 62 IN | SYSTOLIC BLOOD PRESSURE: 132 MMHG | BODY MASS INDEX: 30.44 KG/M2 | HEART RATE: 92 BPM | WEIGHT: 165.4 LBS

## 2024-04-10 DIAGNOSIS — I48.0 PAROXYSMAL ATRIAL FIBRILLATION (HCC): Primary | ICD-10-CM

## 2024-04-10 DIAGNOSIS — I47.19 ATRIAL TACHYCARDIA (HCC): ICD-10-CM

## 2024-04-10 PROCEDURE — G8417 CALC BMI ABV UP PARAM F/U: HCPCS | Performed by: INTERNAL MEDICINE

## 2024-04-10 PROCEDURE — 3017F COLORECTAL CA SCREEN DOC REV: CPT | Performed by: INTERNAL MEDICINE

## 2024-04-10 PROCEDURE — G8400 PT W/DXA NO RESULTS DOC: HCPCS | Performed by: INTERNAL MEDICINE

## 2024-04-10 PROCEDURE — 1123F ACP DISCUSS/DSCN MKR DOCD: CPT | Performed by: INTERNAL MEDICINE

## 2024-04-10 PROCEDURE — 3075F SYST BP GE 130 - 139MM HG: CPT | Performed by: INTERNAL MEDICINE

## 2024-04-10 PROCEDURE — 99214 OFFICE O/P EST MOD 30 MIN: CPT | Performed by: INTERNAL MEDICINE

## 2024-04-10 PROCEDURE — G8427 DOCREV CUR MEDS BY ELIG CLIN: HCPCS | Performed by: INTERNAL MEDICINE

## 2024-04-10 PROCEDURE — 93000 ELECTROCARDIOGRAM COMPLETE: CPT | Performed by: INTERNAL MEDICINE

## 2024-04-10 PROCEDURE — 1036F TOBACCO NON-USER: CPT | Performed by: INTERNAL MEDICINE

## 2024-04-10 PROCEDURE — 3079F DIAST BP 80-89 MM HG: CPT | Performed by: INTERNAL MEDICINE

## 2024-04-10 PROCEDURE — 1090F PRES/ABSN URINE INCON ASSESS: CPT | Performed by: INTERNAL MEDICINE

## 2024-04-10 NOTE — PATIENT INSTRUCTIONS
Plan:  Recommend [employer] Mobile device (Android version) to monitor heart rate/rhythm   -available at [employer], [employer] or [employer] ( [employer] )  Continue cardiac medications as prescribed  Get echo as ordered by MIHAI Iniguez  Follow up with EP NP in 6 months       Your provider has ordered testing for further evaluation.  An order/prescription has been included in your paper work.   To schedule outpatient testing, contact Central Scheduling by calling [employer] ([phone]).

## 2024-05-07 ENCOUNTER — TELEPHONE (OUTPATIENT)
Dept: PHARMACY | Facility: CLINIC | Age: 69
End: 2024-05-07

## 2024-05-07 NOTE — TELEPHONE ENCOUNTER
[facility] CLINICAL PHARMACY: ADHERENCE REVIEW  Identified care gap per [employer]: fills at [facility]: ACE/ARB and Statin adherence    ASSESSMENT    ACE/ARB ADHERENCE    Insurance Records claims through 2024 (Prior Year PDC = 84% - PASSED ; YTD PDC = FIRST FILL; Potential Fail Date: 24):     Lisinopril 20mg  last filled on 24 for 90 day supply. Next refill due: 24    Prescribed si tablet/capsule daily    Per Insurer Portal: same as above.    Per [facility]:  3 refills remaining.    BP Readings from Last 3 Encounters:   04/10/24 132/84   24 122/60   23 (!) 144/72     Estimated Creatinine Clearance: 71 mL/min (based on SCr of 0.7 mg/dL).  Lab Results   Component Value Date    CREATININE 0.7 2024     Lab Results   Component Value Date    K 4.5 2024     STATIN ADHERENCE    Insurance Records claims through 2024 (Prior Year PDC = 81% - PASSED ; YTD PDC = FIRST FILL; Potential Fail Date: 24):     Atorvastatin 40mg last filled on 24 for 90 day supply. Next refill due: 24    Prescribed si tablet/capsule daily    Per Insurer Portal: same as above.     Per [facility]:  3 refills remaining.    Lab Results   Component Value Date    CHOL 176 2024    TRIG 79 2024    HDL 76 (H) 2024     ALT   Date Value Ref Range Status   2024 16 10 - 40 U/L Final     AST   Date Value Ref Range Status   2024 20 15 - 37 U/L Final     The 10-year ASCVD risk score (Garrett DAMON, et al., 2019) is: 12.1%    Values used to calculate the score:      Age: 69 years      Sex: Female      Is Non- : No      Diabetic: No      Tobacco smoker: No      Systolic Blood Pressure: 132 mmHg      Is BP treated: Yes      HDL Cholesterol: 76 mg/dL      Total Cholesterol: 176 mg/dL     PLAN  Per insurer report, LIS-0 - co-pays are based on tiers and patient is subject to coverage gap.      The following are interventions that have been

## 2024-10-15 NOTE — PROGRESS NOTES
[facility]   Electrophysiology Outpatient Note              Date:  October 23, 2024  Patient name: Nannette Cabrera  YOB: 1955    Primary Care physician: No primary care provider on file.    HISTORY OF PRESENT ILLNESS: The patient is a 69 y.o.  female with a history of paroxysmal atrial fibrillation ,severe MR and flail posterior leaflet s/p MV ring repair in 2018. HTN, and HLD    Patient follows with Dr. Patterson in EP clinic.  8/30/2022 patient was found to be in atrial fibrillation at cardiology follow-up.  She was started on Eliquis.Patient wore a cardiac event monitor from 8/30/2022 to 9/2/2022 which demonstrated SR with an average HR of 94 (). PAC burden 0.05%, PVC burden 0.1%.    In 10/2022 her Metoprolol was increased to 50 mg daily. She wore a cardiac event monitor from 10/5-11/1/2022 to evaluate the effects of her increase metoprolol which demonstrated SR with an average HR of 83 () BPM, PAC burden 0.08%, PVC burden 0.08%, 1 episode NSVT (4 beats), 4 episodes SVT(longest 6 beats) and nocturnal Mobitz I AVB.    Today she is being seen for questionable history of paroxysmal atrial fibrillation. ECG shows atrial rhythm with a first-degree AV block rate 98. Patient patient denies chest pain, shortness with and palpitations.  Patient states she has had no palpitations.  If she would start to have palpitations she does have [employer] mobile.  Patient does have history of mitral valve repair.  She was supposed to get follow-up echo 1/2024 which she did not do.  Will reorder echocardiogram to assess mitral valve and heart function.  Patient is agreeable.  Also discussed increasing patient's physical activity.  Patient states there is a park near where she lives.  She plans on walking there several times a week.  She also states she has some stress going on at home however it is starting to improve.    Past Medical History:   has a past medical history of Anxiety, ETOH

## 2024-10-23 ENCOUNTER — OFFICE VISIT (OUTPATIENT)
Dept: CARDIOLOGY CLINIC | Age: 69
End: 2024-10-23

## 2024-10-23 VITALS
OXYGEN SATURATION: 100 % | HEART RATE: 98 BPM | DIASTOLIC BLOOD PRESSURE: 84 MMHG | HEIGHT: 62 IN | BODY MASS INDEX: 30.4 KG/M2 | SYSTOLIC BLOOD PRESSURE: 132 MMHG | WEIGHT: 165.2 LBS

## 2024-10-23 DIAGNOSIS — I47.19 ATRIAL TACHYCARDIA (HCC): ICD-10-CM

## 2024-10-23 DIAGNOSIS — I48.0 PAROXYSMAL ATRIAL FIBRILLATION (HCC): Primary | ICD-10-CM

## 2024-10-23 DIAGNOSIS — R01.1 HEART MURMUR: ICD-10-CM

## 2024-10-23 NOTE — PATIENT INSTRUCTIONS
Continue Toprol-XL 50 mg daily  Continue lisinopril 20 mg daily  Continue Norvasc 10 mg daily  Continue Lipitor 40 mg daily  Continue aspirin 81 mg daily  Continue to use [employer]agBet as needed for palpitations  Echo to reassess Mitral valve and heart function--call [facility]-JACINTO to schedule    Follow up in 9 months Nati BERGMAN

## 2025-01-29 ENCOUNTER — HOSPITAL ENCOUNTER (OUTPATIENT)
Dept: CARDIOLOGY | Age: 70
Discharge: HOME | End: 2025-01-31
Payer: MEDICARE

## 2025-01-29 VITALS
WEIGHT: 165 LBS | HEIGHT: 62 IN | SYSTOLIC BLOOD PRESSURE: 132 MMHG | DIASTOLIC BLOOD PRESSURE: 82 MMHG | BODY MASS INDEX: 30.36 KG/M2

## 2025-01-29 DIAGNOSIS — R01.1 HEART MURMUR: ICD-10-CM

## 2025-01-29 LAB
ECHO AO ASC DIAM: 3.5 CM
ECHO AO ASCENDING AORTA INDEX: 1.99 CM/M2
ECHO AO ROOT DIAM: 3 CM
ECHO AO ROOT INDEX: 1.7 CM/M2
ECHO AV AREA PEAK VELOCITY: 3.3 CM2
ECHO AV AREA VTI: 3 CM2
ECHO AV AREA/BSA PEAK VELOCITY: 1.9 CM2/M2
ECHO AV AREA/BSA VTI: 1.7 CM2/M2
ECHO AV MEAN GRADIENT: 3 MMHG
ECHO AV MEAN VELOCITY: 0.7 M/S
ECHO AV PEAK GRADIENT: 5 MMHG
ECHO AV PEAK VELOCITY: 1.1 M/S
ECHO AV VELOCITY RATIO: 0.82
ECHO AV VTI: 18.7 CM
ECHO BSA: 1.81 M2
ECHO EST RA PRESSURE: 8 MMHG
ECHO LA AREA 2C: 22.5 CM2
ECHO LA AREA 4C: 21 CM2
ECHO LA DIAMETER INDEX: 2.22 CM/M2
ECHO LA DIAMETER: 3.9 CM
ECHO LA MAJOR AXIS: 6 CM
ECHO LA MINOR AXIS: 5.7 CM
ECHO LA TO AORTIC ROOT RATIO: 1.3
ECHO LA VOL BP: 66 ML (ref 22–52)
ECHO LA VOL MOD A2C: 71 ML (ref 22–52)
ECHO LA VOL MOD A4C: 58 ML (ref 22–52)
ECHO LA VOL/BSA BIPLANE: 38 ML/M2 (ref 16–34)
ECHO LA VOLUME INDEX MOD A2C: 40 ML/M2 (ref 16–34)
ECHO LA VOLUME INDEX MOD A4C: 33 ML/M2 (ref 16–34)
ECHO LV E' LATERAL VELOCITY: 12.8 CM/S
ECHO LV E' SEPTAL VELOCITY: 14.7 CM/S
ECHO LV EDV A2C: 47 ML
ECHO LV EDV A4C: 42 ML
ECHO LV EDV INDEX A4C: 24 ML/M2
ECHO LV EDV NDEX A2C: 27 ML/M2
ECHO LV EF PHYSICIAN: 58 %
ECHO LV EJECTION FRACTION A2C: 54 %
ECHO LV EJECTION FRACTION A4C: 53 %
ECHO LV EJECTION FRACTION BIPLANE: 54 % (ref 55–100)
ECHO LV ESV A2C: 22 ML
ECHO LV ESV A4C: 20 ML
ECHO LV ESV INDEX A2C: 13 ML/M2
ECHO LV ESV INDEX A4C: 11 ML/M2
ECHO LV FRACTIONAL SHORTENING: 30 % (ref 28–44)
ECHO LV INTERNAL DIMENSION DIASTOLE INDEX: 2.61 CM/M2
ECHO LV INTERNAL DIMENSION DIASTOLIC: 4.6 CM (ref 3.9–5.3)
ECHO LV INTERNAL DIMENSION SYSTOLIC INDEX: 1.82 CM/M2
ECHO LV INTERNAL DIMENSION SYSTOLIC: 3.2 CM
ECHO LV ISOVOLUMETRIC RELAXATION TIME (IVRT): 72 MS
ECHO LV IVSD: 0.8 CM (ref 0.6–0.9)
ECHO LV MASS 2D: 127.7 G (ref 67–162)
ECHO LV MASS INDEX 2D: 72.5 G/M2 (ref 43–95)
ECHO LV POSTERIOR WALL DIASTOLIC: 0.9 CM (ref 0.6–0.9)
ECHO LV RELATIVE WALL THICKNESS RATIO: 0.39
ECHO LVOT AREA: 4.2 CM2
ECHO LVOT AV VTI INDEX: 0.73
ECHO LVOT DIAM: 2.3 CM
ECHO LVOT MEAN GRADIENT: 1 MMHG
ECHO LVOT PEAK GRADIENT: 3 MMHG
ECHO LVOT PEAK VELOCITY: 0.9 M/S
ECHO LVOT STROKE VOLUME INDEX: 32.1 ML/M2
ECHO LVOT SV: 56.5 ML
ECHO LVOT VTI: 13.6 CM
ECHO MV AREA VTI: 2 CM2
ECHO MV E DECELERATION TIME (DT): 182 MS
ECHO MV E VELOCITY: 1.39 M/S
ECHO MV E/E' LATERAL: 10.86
ECHO MV E/E' RATIO (AVERAGED): 10.16
ECHO MV E/E' SEPTAL: 9.46
ECHO MV LVOT VTI INDEX: 2.03
ECHO MV MAX VELOCITY: 1.6 M/S
ECHO MV MEAN GRADIENT: 5 MMHG
ECHO MV MEAN VELOCITY: 1.1 M/S
ECHO MV PEAK GRADIENT: 10 MMHG
ECHO MV VTI: 27.6 CM
ECHO RA AREA 4C: 13.3 CM2
ECHO RA END SYSTOLIC VOLUME APICAL 4 CHAMBER INDEX BSA: 16 ML/M2
ECHO RA VOLUME: 28 ML
ECHO RIGHT VENTRICULAR SYSTOLIC PRESSURE (RVSP): 35 MMHG
ECHO RV BASAL DIMENSION: 2.3 CM
ECHO RV FREE WALL PEAK S': 10.7 CM/S
ECHO RV LONGITUDINAL DIMENSION: 5.5 CM
ECHO RV MID DIMENSION: 1.9 CM
ECHO RV TAPSE: 1.5 CM (ref 1.7–?)
ECHO TV REGURGITANT MAX VELOCITY: 2.62 M/S
ECHO TV REGURGITANT PEAK GRADIENT: 27 MMHG

## 2025-01-29 PROCEDURE — 93306 TTE W/DOPPLER COMPLETE: CPT

## 2025-01-30 DIAGNOSIS — I10 ESSENTIAL HYPERTENSION: ICD-10-CM

## 2025-01-30 DIAGNOSIS — Z98.890 S/P MVR (MITRAL VALVE REPAIR): ICD-10-CM

## 2025-01-31 NOTE — TELEPHONE ENCOUNTER
Last Office Visit:  1/2/2024 Provider: SHOLA  **Is provider OOT? No    Next Office Visit: Visit date not found Provider:   **If no OV, when does pt need to be seen? in 1 year(s) w/ [facility]  **Has patient already had 30 day supply? No      LAST LABS:   CMP:   Lab Results   Component Value Date     01/03/2024    K 4.5 01/03/2024     01/03/2024    CO2 26 01/03/2024    BUN 15 01/03/2024    CREATININE 0.7 01/03/2024    GLUCOSE 89 01/03/2024    CALCIUM 9.2 01/03/2024    BILITOT 0.7 01/03/2024    ALKPHOS 108 01/03/2024    AST 20 01/03/2024    ALT 16 01/03/2024    LABGLOM >60 01/03/2024    GFRAA >60 03/02/2022    AGRATIO 1.8 01/03/2024    GLOB 2.7 07/15/2020

## 2025-01-31 NOTE — TELEPHONE ENCOUNTER
Pt scheduled with [facility] 3/26/25 ([facility] no avail until 9/2025 at [facility]). Please send medication to get pt to appt

## 2025-02-03 RX ORDER — AMLODIPINE BESYLATE 10 MG/1
10 TABLET ORAL DAILY
Qty: 30 TABLET | Refills: 1 | Status: SHIPPED | OUTPATIENT
Start: 2025-02-03

## 2025-03-18 DIAGNOSIS — E78.5 HYPERLIPIDEMIA, UNSPECIFIED HYPERLIPIDEMIA TYPE: ICD-10-CM

## 2025-03-19 RX ORDER — ATORVASTATIN CALCIUM 40 MG/1
40 TABLET, FILM COATED ORAL DAILY
Qty: 30 TABLET | Refills: 0 | Status: SHIPPED | OUTPATIENT
Start: 2025-03-19

## 2025-03-19 NOTE — TELEPHONE ENCOUNTER
Last Office Visit: 01/02/24Provider: SHOLA  **Is provider OOT? No    Next Office Visit: 3/26/2025 Provider: SHOLA      LAST LABS:   Lipid:   Lab Results   Component Value Date    CHOL 176 01/03/2024    TRIG 79 01/03/2024    HDL 76 (H) 01/03/2024    LDL 84 01/03/2024    VLDL 16 01/03/2024     Lab orders needed? yes - lipid panel     Labs ordered, spoke w./ pt, informed her to complete prior to OV, pt vu.

## 2025-03-21 NOTE — ASSESSMENT & PLAN NOTE
Heart rate slightly elevated, potentially due to anxiety related to the doctor's visit.  - Monitor heart rate and consider anxiety management strategies.  -No recent arrhythmia or episodes of AFib noted

## 2025-03-21 NOTE — PROGRESS NOTES
[facility]   Cardiac Evaluation    Primary Care Doctor:  No primary care provider on file.    Chief Complaint   Patient presents with    Follow-up    Hypertension    Hyperlipidemia         DIAGNOSIS:    1. Severe mitral regurgitation    2. S/P MVR (mitral valve repair)    3. Paroxysmal atrial fibrillation (HCC)    4. Essential hypertension    5. Mixed hyperlipidemia    6. Hyperlipidemia, unspecified hyperlipidemia type        Patient Plan:   No change in current heart/ BP medicines  Have fasting blood work at your earliest convenience  Follow up with Dr. uRdi Rios in 1 year      Assessment & Plan  Severe mitral regurgitation   Echocardiogram results from 01/2025 indicate satisfactory heart function with an ejection fraction of 55% to 60%, normal heart size and thickness, and normal pressures. The mitral valve ring is appropriately positioned with only mild calcification or stenosis and minimal leakage.  - Continue current medication regimen: metoprolol 50 mg, lisinopril 20 mg, Lipitor 40 mg, aspirin, and amlodipine 10 mg.  - Prescription refill to be sent to [facility] on [address].  - Order fasting blood work to monitor cholesterol levels and liver function.  - Encourage establishing care with a primary care physician.  S/P MVR (mitral valve repair)   As above  Paroxysmal atrial fibrillation (HCC)   Heart rate slightly elevated, potentially due to anxiety related to the doctor's visit.  - Monitor heart rate and consider anxiety management strategies.  -No recent arrhythmia or episodes of AFib noted  Essential hypertension   Chronic, at goal (stable), continue current treatment plan  Mixed hyperlipidemia   On statin, needs fasting lipid panel, CMP (ordered)     History of Present Illness   I had the pleasure of seeing Nannette Cabrera (70 y.o.) in follow up for severe MR s/p mitral valve ring (2018), hypertension, hyperlipidemia, PAF (found August 2022, no anticoagulation since no further episodes noted)

## 2025-03-21 NOTE — ASSESSMENT & PLAN NOTE
Echocardiogram results from 01/2025 indicate satisfactory heart function with an ejection fraction of 55% to 60%, normal heart size and thickness, and normal pressures. The mitral valve ring is appropriately positioned with only mild calcification or stenosis and minimal leakage.  - Continue current medication regimen: metoprolol 50 mg, lisinopril 20 mg, Lipitor 40 mg, aspirin, and amlodipine 10 mg.  - Prescription refill to be sent to [facility] on [address].  - Order fasting blood work to monitor cholesterol levels and liver function.  - Encourage establishing care with a primary care physician.

## 2025-03-26 ENCOUNTER — OFFICE VISIT (OUTPATIENT)
Dept: CARDIOLOGY CLINIC | Age: 70
End: 2025-03-26

## 2025-03-26 VITALS
DIASTOLIC BLOOD PRESSURE: 76 MMHG | HEART RATE: 100 BPM | OXYGEN SATURATION: 97 % | BODY MASS INDEX: 30.91 KG/M2 | HEIGHT: 62 IN | WEIGHT: 168 LBS | SYSTOLIC BLOOD PRESSURE: 138 MMHG

## 2025-03-26 DIAGNOSIS — I48.0 PAROXYSMAL ATRIAL FIBRILLATION (HCC): ICD-10-CM

## 2025-03-26 DIAGNOSIS — E78.5 HYPERLIPIDEMIA, UNSPECIFIED HYPERLIPIDEMIA TYPE: ICD-10-CM

## 2025-03-26 DIAGNOSIS — E78.2 MIXED HYPERLIPIDEMIA: ICD-10-CM

## 2025-03-26 DIAGNOSIS — Z98.890 S/P MVR (MITRAL VALVE REPAIR): ICD-10-CM

## 2025-03-26 DIAGNOSIS — I10 ESSENTIAL HYPERTENSION: ICD-10-CM

## 2025-03-26 DIAGNOSIS — I34.0 SEVERE MITRAL REGURGITATION: Primary | ICD-10-CM

## 2025-03-26 RX ORDER — ATORVASTATIN CALCIUM 40 MG/1
40 TABLET, FILM COATED ORAL DAILY
Qty: 90 TABLET | Refills: 3 | Status: SHIPPED | OUTPATIENT
Start: 2025-03-26

## 2025-03-26 RX ORDER — ASPIRIN 81 MG/1
81 TABLET ORAL DAILY
Qty: 90 TABLET | Refills: 3 | Status: SHIPPED | OUTPATIENT
Start: 2025-03-26

## 2025-03-26 RX ORDER — METOPROLOL SUCCINATE 50 MG/1
50 TABLET, EXTENDED RELEASE ORAL DAILY
Qty: 90 TABLET | Refills: 3 | Status: SHIPPED | OUTPATIENT
Start: 2025-03-26

## 2025-03-26 RX ORDER — AMLODIPINE BESYLATE 10 MG/1
10 TABLET ORAL DAILY
Qty: 90 TABLET | Refills: 3 | Status: SHIPPED | OUTPATIENT
Start: 2025-03-26

## 2025-03-26 RX ORDER — LISINOPRIL 20 MG/1
20 TABLET ORAL DAILY
Qty: 90 TABLET | Refills: 3 | Status: SHIPPED | OUTPATIENT
Start: 2025-03-26

## 2025-03-26 NOTE — PATIENT INSTRUCTIONS
No change in current heart/ BP medicines  Have fasting blood work at your earliest convenience  Follow up with Dr. Rudi Rios in 1 year